# Patient Record
Sex: FEMALE | Race: WHITE | NOT HISPANIC OR LATINO | Employment: FULL TIME | URBAN - METROPOLITAN AREA
[De-identification: names, ages, dates, MRNs, and addresses within clinical notes are randomized per-mention and may not be internally consistent; named-entity substitution may affect disease eponyms.]

---
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
## 2023-06-24 ENCOUNTER — OFFICE VISIT (OUTPATIENT)
Dept: URGENT CARE | Facility: CLINIC | Age: 18
End: 2023-06-24
Payer: MEDICAID

## 2023-06-24 VITALS
RESPIRATION RATE: 18 BRPM | SYSTOLIC BLOOD PRESSURE: 108 MMHG | WEIGHT: 119.06 LBS | BODY MASS INDEX: 18.69 KG/M2 | OXYGEN SATURATION: 99 % | HEART RATE: 92 BPM | HEIGHT: 67 IN | DIASTOLIC BLOOD PRESSURE: 78 MMHG | TEMPERATURE: 98 F

## 2023-06-24 DIAGNOSIS — R51.9 UNILATERAL HEADACHE: Primary | ICD-10-CM

## 2023-06-24 PROCEDURE — 99213 PR OFFICE/OUTPT VISIT, EST, LEVL III, 20-29 MIN: ICD-10-PCS | Mod: S$GLB,,, | Performed by: NURSE PRACTITIONER

## 2023-06-24 PROCEDURE — 99213 OFFICE O/P EST LOW 20 MIN: CPT | Mod: S$GLB,,, | Performed by: NURSE PRACTITIONER

## 2023-06-24 RX ORDER — NAPROXEN 500 MG/1
500 TABLET ORAL 2 TIMES DAILY WITH MEALS
Qty: 14 TABLET | Refills: 0 | Status: SHIPPED | OUTPATIENT
Start: 2023-06-24 | End: 2023-07-01

## 2023-06-24 RX ORDER — PREDNISONE 20 MG/1
20 TABLET ORAL DAILY
Qty: 5 TABLET | Refills: 0 | Status: SHIPPED | OUTPATIENT
Start: 2023-06-24 | End: 2023-06-29

## 2023-06-24 NOTE — PATIENT INSTRUCTIONS
Please drink plenty of fluids.  Please get plenty of rest.  Please return here or go to the Emergency Department for any concerns or worsening of condition.    If you were prescribed a narcotic medication, do not drive or operate heavy equipment or machinery while taking these medications.    For patients who are not allergic to and are not on anticoagulants, you can alternate Tylenol every 4 hours and Motrin every 6 hours.  For patients who are allergic or intolerant to NSAIDS, have gastritis, gastric ulcers, or history of GI bleeds, are pregnant, or are on anticoagulant therapy, you can take Tylenol every 4 hours as needed for pain.    You should schedule a follow-up appointment with your Primary Care Provider for a recheck in 2-3 days or as directed at this visit.     If your condition fails to improve in a timely manner, you should receive another evaluation by your Primary Care Provider to discuss your concerns or return to urgent care for a recheck.      If your condition worsens at any time, you should report immediately to your nearest Emergency Department for further evaluation. **You must understand that you have received Urgent Care treatment only and that you may be released before all of your medical problems are known or treated. You, the patient, are responsible to arrange for follow-up care as instructed.        Please follow up with your primary care doctor or specialist as needed.        Tension Headache    A muscle tension headache is a very common cause of head pain. Its also called a stress headache. When some people are under stress, they tense the muscles of their shoulder, neck, and scalp without knowing it. If this tension lasts long enough, a headache can occur. A tension headache can be quite painful. It can last for hours or even days.  Home care  Follow these tips when caring for yourself at home:  Dont drive yourself home if you were given pain medicine for your headache. Instead,  have someone else drive you home. Try to sleep when you get home. You should feel much better when you wake up.  Put heat on the back of your neck to help ease neck spasm.  Drink only clear liquids or eat a light diet until your symptoms get better. This will help you avoid nausea or vomiting.  How to prevent headaches  Figure out what is causing stress in your life. Learn new ways to handle your stress. Ideas include regular exercise, biofeedback, self-hypnosis, yoga, and meditation. Talk with your healthcare provider to find out more information about managing stress. Many books and digital media are also available on this subject.  Take time out at the first sign of a tension headache, if possible. Take yourself out of the stressful situation. Find a quiet, comfortable place to sit or lie down and let yourself relax. Heat and deep massage of the tight areas in the neck and shoulders may help ease muscle spasm. You may also get relief from a medicine like ibuprofen or a prescribed muscle relaxant.  Follow-up care  Follow up with your healthcare provider, or as advised. Talk with your provider if you have frequent headaches. He or she can figure out a treatment plan. Ask if you can have medicine to take at home the next time you get a bad headache. This may keep you from having to visit the emergency department in the future. You may need to see a headache specialist (neurologist) if you continue to have headaches.  When to seek medical advice  Call your healthcare provider right away if any of these occur:  Your head pain gets worse during sexual intercourse or strenuous activity  Your head pain doesnt get better within 24 hours  You arent able to keep liquids down (repeated vomiting)  Fever of 100.4ºF (38ºC) or higher, or as directed by your healthcare provider  Stiff neck  Extreme drowsiness, confusion, or fainting  Dizziness or dizziness with spinning sensation (vertigo)  Weakness in an arm or leg or one side  "of your face  You have difficulty speaking  Your vision changes  Date Last Reviewed: 8/1/2016  © 6059-5558 [employer]. [address] [place] [address], [place] [address]. All rights reserved. This information is not intended as a substitute for professional medical care. Always follow your healthcare professional's instructions.      Headache, Unspecified    A number of things can cause headaches. The cause of your headache isnt clear. But it doesnt seem to be a sign of any serious illness.  You could have a tension headache or a migraine headache.  Stress can cause a tension headache. This can happen if you tense the muscles of your shoulders, neck, and scalp without knowing it. If this stress lasts long enough, you may develop a tension headache.  It is not clear why migraines occur, but certain things called" triggers" can raise the risk of having a migraine attack. Migraine triggers may include emotional stress or depression, or by hormone changes during the menstrual cycle. Other triggers include birth control pills and other medicines, alcohol or caffeine, foods with tyramine (such as aged cheese, wine), eyestrain, weather changes, missed meals, and lack of sleep or oversleeping.  Other causes of headache include:  Viral illness with high fever  Head injury with concussion  Sinus, ear, or throat infection  Dental pain and jaw joint (TMJ) pain  More serious but less common causes of headache include stroke, brain hemorrhage, brain tumor, meningitis, and encephalitis.  Home care  Follow these tips when taking care of yourself at home:  Dont drive yourself home if you were given pain medicine for your headache. Instead, have someone else drive you home. Try to sleep when you get home. You should feel much better when you wake up.  Apply heat to the back of your neck to ease a neck muscle spasm. Take care of a migraine headache by putting an ice pack on your forehead or at the base of your skull.  If you " have nausea or vomiting, eat a light diet until your headache eases.  If you have a migraine headache, use sunglasses when in the daylight or around bright indoor lighting until your symptoms get better. Bright glaring light can make this type of headache worse.  Follow-up care  Follow up with your healthcare provider, or as advised. Talk with your provider if you have frequent headaches. He or she can help figure out a treatment plan. By knowing the earliest signs of headache, and starting treatment right away, you may be able to stop the pain yourself.  When to seek medical advice  Call your healthcare provider right away if any of these occur:  Your head pain suddenly gets worse after sexual intercourse or strenuous activity  Your head pain doesnt get better within 24 hours  You arent able to keep liquids down (repeated vomiting)  Fever of 100.4ºF (38ºC) or higher, or as directed by your healthcare provider  Stiff neck  Extreme drowsiness, confusion, or fainting  Dizziness or dizziness with spinning sensation (vertigo)  Weakness in an arm or leg or one side of your face  You have trouble talking or seeing  Date Last Reviewed: 8/1/2016  © 6211-4412 [employer]. [address] [place] [address]. All rights reserved. This information is not intended as a substitute for professional medical care. Always follow your healthcare professional's instructions.            1.  Take all medications as directed. If you have been prescribed antibiotics, make sure to complete them.   2.  Rest and keep yourself/patient well hydrated. For adults, it is recommended to drink at least 8-10 glasses of water daily.   3. You should schedule a follow-up appointment with your Primary Care Provider/Pediatrician for recheck in 2-3 days or as directed at this visit.   4.  If your condition fails to improve in a timely manner, you should receive another evaluation by your Primary Care Provider/Pediatrician to discuss  your concerns or return to urgent care for a recheck.  If your condition worsens at any time, you should report immediately to your nearest Emergency Department for further evaluation. **You must understand that you have received Urgent Care treatment only and that you may be released before all of your medical problems are known or treated. You, the patient, are responsible to arrange for follow-up care as instructed.

## 2023-06-24 NOTE — LETTER
June 24, 2023      [facility]  [address], [address]  [place] [address]  Phone: [phone]  Fax: [phone]       Patient: Eboni Wyatt   YOB: 2005  Date of Visit: 06/24/2023    To Whom It May Concern:    Lj Wyatt  was at [facility] on 06/24/2023. The patient may return to work/school on 6/25/2023 with no restrictions if symptoms have resolved. If you have any questions or concerns, or if I can be of further assistance, please do not hesitate to contact me.    Sincerely,    Theresa Blair NP

## 2023-06-24 NOTE — PROGRESS NOTES
"Subjective:      Patient ID: Eboni Wyatt is a 18 y.o. female.    Vitals:  height is 5' 6.89" (1.699 m) and weight is 54 kg (119 lb 0.8 oz). Her oral temperature is 97.8 °F (36.6 °C). Her blood pressure is 108/78 and her pulse is 92. Her respiration is 18 and oxygen saturation is 99%.     Chief Complaint: Migraine    Patient is coming in for a migraine that's been going on for 3 days. Pain is on right side behind eye and radiates to temporal area and to neck. Pain described as sharp and throbbing. Reports associated intermittent blurry vision and intermittent nausea. Blurry vision is new onset and lasts a few seconds. Wears glasses but no new changes in prescription. No fever or chills. No dizziness or syncope. No recent illness. Pt states she has hx of migraines about 2 x month that has been going on since she was very young.  States this episode lasting longer than usual and no relief with OTC medication. Was on amitriptyline in the past but quit working and no longer taking.  No previous testing. She has never been evaluated by neurology.     Migraine   This is a new problem. The current episode started in the past 7 days. The problem occurs constantly. The problem has been unchanged. The pain is located in the Right unilateral region. Radiates to: behind her eyes to her tempal. The quality of the pain is described as sharp and throbbing. The pain is at a severity of 5/10. The pain is moderate. Associated symptoms include blurred vision, eye pain and nausea. Pertinent negatives include no abdominal pain, dizziness, eye redness, eye watering, fever, hearing loss, numbness, phonophobia, photophobia, scalp tenderness, sinus pressure, tingling, tinnitus or weakness. Nothing aggravates the symptoms. Treatments tried: OTC medication. The treatment provided no relief. Her past medical history is significant for migraine headaches.     Constitution: Negative for fever.   HENT:  Negative for tinnitus, hearing loss " and sinus pressure.    Eyes:  Positive for eye pain and blurred vision. Negative for eye redness and photophobia.   Gastrointestinal:  Positive for nausea. Negative for abdominal pain.   Neurological:  Positive for history of migraines. Negative for dizziness and numbness.    Objective:     Physical Exam   Constitutional: She is oriented to person, place, and time. She appears well-developed.  Non-toxic appearance. She does not appear ill. No distress.   HENT:   Head: Normocephalic and atraumatic.   Ears:   Right Ear: Tympanic membrane, external ear and ear canal normal.   Left Ear: Tympanic membrane, external ear and ear canal normal.   Nose: Nose normal. No mucosal edema, rhinorrhea or nasal deformity. No epistaxis. Right sinus exhibits no maxillary sinus tenderness and no frontal sinus tenderness. Left sinus exhibits no maxillary sinus tenderness and no frontal sinus tenderness.   Mouth/Throat: Uvula is midline, oropharynx is clear and moist and mucous membranes are normal. No trismus in the jaw. Normal dentition. No uvula swelling. No posterior oropharyngeal erythema.   Eyes: Conjunctivae, EOM and lids are normal. Pupils are equal, round, and reactive to light. No visual field deficit is present. Right eye exhibits no discharge. Left eye exhibits no discharge. No scleral icterus. Extraocular movement intact   Neck: Trachea normal and phonation normal. Neck supple. No neck rigidity present. No decreased range of motion present.   Cardiovascular: Normal rate, regular rhythm, normal heart sounds and normal pulses.   Pulmonary/Chest: Effort normal and breath sounds normal. No accessory muscle usage. No tachypnea. No respiratory distress.   Abdominal: Normal appearance and bowel sounds are normal. She exhibits no distension. Soft. There is no abdominal tenderness.   Musculoskeletal: Normal range of motion.         General: No deformity. Normal range of motion.   Neurological: She is alert and oriented to person,  place, and time. She has normal motor skills and normal sensation. She displays facial symmetry. No cranial nerve deficit. She exhibits normal muscle tone. Gait and coordination normal. Coordination normal. GCS eye subscore is 4. GCS verbal subscore is 5. GCS motor subscore is 6.   Skin: Skin is warm, dry, intact, not diaphoretic and not pale.   Psychiatric: She experiences Normal attention. Her speech is normal and behavior is normal. Judgment and thought content normal.   Nursing note and vitals reviewed.    Assessment:     1. Unilateral headache        Plan:     Vision Screening    Right eye Left eye Both eyes   Without correction      With correction 20/15 20/20 20/15        Unilateral headache  -     naproxen (NAPROSYN) 500 MG tablet; Take 1 tablet (500 mg total) by mouth 2 (two) times daily with meals. for 7 days  Dispense: 14 tablet; Refill: 0  -     predniSONE (DELTASONE) 20 MG tablet; Take 1 tablet (20 mg total) by mouth once daily. for 5 days  Dispense: 5 tablet; Refill: 0  -     Ambulatory referral/consult to Neurology

## 2023-06-28 ENCOUNTER — TELEPHONE (OUTPATIENT)
Dept: URGENT CARE | Facility: CLINIC | Age: 18
End: 2023-06-28
Payer: MEDICAID

## 2023-06-28 NOTE — TELEPHONE ENCOUNTER
Called pt for courtesy call back. Pt states she is feeling better but still having headaches and hs an apt with primary.

## 2023-09-30 ENCOUNTER — OFFICE VISIT (OUTPATIENT)
Dept: URGENT CARE | Facility: CLINIC | Age: 18
End: 2023-09-30
Payer: MEDICAID

## 2023-09-30 VITALS
SYSTOLIC BLOOD PRESSURE: 108 MMHG | BODY MASS INDEX: 23.41 KG/M2 | OXYGEN SATURATION: 98 % | HEIGHT: 61 IN | WEIGHT: 124 LBS | TEMPERATURE: 99 F | DIASTOLIC BLOOD PRESSURE: 77 MMHG | RESPIRATION RATE: 18 BRPM | HEART RATE: 112 BPM

## 2023-09-30 DIAGNOSIS — J02.0 STREP PHARYNGITIS: Primary | ICD-10-CM

## 2023-09-30 DIAGNOSIS — R00.0 TACHYCARDIA: ICD-10-CM

## 2023-09-30 DIAGNOSIS — J02.9 SORE THROAT: ICD-10-CM

## 2023-09-30 LAB
CTP QC/QA: YES
MOLECULAR STREP A: POSITIVE

## 2023-09-30 PROCEDURE — 87651 STREP A DNA AMP PROBE: CPT | Mod: QW,S$GLB,, | Performed by: PHYSICIAN ASSISTANT

## 2023-09-30 PROCEDURE — 87651 POCT STREP A MOLECULAR: ICD-10-PCS | Mod: QW,S$GLB,, | Performed by: PHYSICIAN ASSISTANT

## 2023-09-30 PROCEDURE — 99214 PR OFFICE/OUTPT VISIT, EST, LEVL IV, 30-39 MIN: ICD-10-PCS | Mod: S$GLB,,, | Performed by: PHYSICIAN ASSISTANT

## 2023-09-30 PROCEDURE — 99214 OFFICE O/P EST MOD 30 MIN: CPT | Mod: S$GLB,,, | Performed by: PHYSICIAN ASSISTANT

## 2023-09-30 RX ORDER — AZITHROMYCIN 500 MG/1
500 TABLET, FILM COATED ORAL DAILY
Qty: 5 TABLET | Refills: 0 | Status: SHIPPED | OUTPATIENT
Start: 2023-09-30 | End: 2023-10-05

## 2023-09-30 RX ORDER — ESCITALOPRAM OXALATE 10 MG/1
10 TABLET ORAL
COMMUNITY
Start: 2023-06-30

## 2023-10-05 ENCOUNTER — TELEPHONE (OUTPATIENT)
Dept: NEUROLOGY | Facility: CLINIC | Age: 18
End: 2023-10-05
Payer: MEDICAID

## 2023-10-05 NOTE — TELEPHONE ENCOUNTER
----- Message from Kaycee Harkins sent at 7/18/2023  3:16 PM CDT -----  Regarding: Appt Access  Hello, when attempting to schedule the following patient an appointment with the Neuro department, there were no available appointments due to the insurance. Ms. Wyatt has an Ambulatory referral/consult to Neurology referral that was placed by Theresa Blair NP on 6/24/23.    The patients diagnosis is Unilateral headache [R51.9].     I informed the patient that a member from the Neurology clinical staff will contact them to schedule. I have updated the patients contact information in Epic.       Thank you,     Kaycee Harkins  Access Navigator

## 2024-05-09 ENCOUNTER — HOSPITAL ENCOUNTER (INPATIENT)
Facility: HOSPITAL | Age: 19
LOS: 4 days | Discharge: HOME | DRG: 872 | End: 2024-05-13
Attending: FAMILY MEDICINE | Admitting: INTERNAL MEDICINE
Payer: MEDICAID

## 2024-05-09 DIAGNOSIS — I47.10 SVT (SUPRAVENTRICULAR TACHYCARDIA): ICD-10-CM

## 2024-05-09 DIAGNOSIS — N12 PYELONEPHRITIS: Primary | ICD-10-CM

## 2024-05-09 DIAGNOSIS — R07.9 CHEST PAIN: ICD-10-CM

## 2024-05-09 DIAGNOSIS — A41.9 SEPSIS, DUE TO UNSPECIFIED ORGANISM, UNSPECIFIED WHETHER ACUTE ORGAN DYSFUNCTION PRESENT: ICD-10-CM

## 2024-05-09 DIAGNOSIS — R00.0 TACHYCARDIA: ICD-10-CM

## 2024-05-09 LAB
ALBUMIN SERPL BCP-MCNC: 3.6 G/DL (ref 3.5–5.2)
ALP SERPL-CCNC: 102 U/L (ref 55–135)
ALT SERPL W/O P-5'-P-CCNC: 23 U/L (ref 10–44)
ANION GAP SERPL CALC-SCNC: 12 MMOL/L (ref 8–16)
AST SERPL-CCNC: 20 U/L (ref 10–40)
B-HCG UR QL: NEGATIVE
BACTERIA #/AREA URNS HPF: ABNORMAL /HPF
BASOPHILS # BLD AUTO: 0.02 K/UL (ref 0–0.2)
BASOPHILS NFR BLD: 0.1 % (ref 0–1.9)
BILIRUB SERPL-MCNC: 0.5 MG/DL (ref 0.1–1)
BILIRUB UR QL STRIP: NEGATIVE
BUN SERPL-MCNC: 7 MG/DL (ref 6–20)
CALCIUM SERPL-MCNC: 9.2 MG/DL (ref 8.7–10.5)
CHLORIDE SERPL-SCNC: 106 MMOL/L (ref 95–110)
CLARITY UR: ABNORMAL
CO2 SERPL-SCNC: 19 MMOL/L (ref 23–29)
COLOR UR: YELLOW
CREAT SERPL-MCNC: 0.8 MG/DL (ref 0.5–1.4)
DIFFERENTIAL METHOD BLD: ABNORMAL
EOSINOPHIL # BLD AUTO: 0 K/UL (ref 0–0.5)
EOSINOPHIL NFR BLD: 0 % (ref 0–8)
ERYTHROCYTE [DISTWIDTH] IN BLOOD BY AUTOMATED COUNT: 12.1 % (ref 11.5–14.5)
EST. GFR  (NO RACE VARIABLE): >60 ML/MIN/1.73 M^2
GLUCOSE SERPL-MCNC: 131 MG/DL (ref 70–110)
GLUCOSE UR QL STRIP: NEGATIVE
HCT VFR BLD AUTO: 39.8 % (ref 37–48.5)
HGB BLD-MCNC: 13.4 G/DL (ref 12–16)
HGB UR QL STRIP: ABNORMAL
HYALINE CASTS #/AREA URNS LPF: 0 /LPF
IMM GRANULOCYTES # BLD AUTO: 0.07 K/UL (ref 0–0.04)
IMM GRANULOCYTES NFR BLD AUTO: 0.5 % (ref 0–0.5)
KETONES UR QL STRIP: NEGATIVE
LACTATE SERPL-SCNC: 1.9 MMOL/L (ref 0.5–2.2)
LEUKOCYTE ESTERASE UR QL STRIP: ABNORMAL
LYMPHOCYTES # BLD AUTO: 1.2 K/UL (ref 1–4.8)
LYMPHOCYTES NFR BLD: 8.3 % (ref 18–48)
MCH RBC QN AUTO: 30.7 PG (ref 27–31)
MCHC RBC AUTO-ENTMCNC: 33.7 G/DL (ref 32–36)
MCV RBC AUTO: 91 FL (ref 82–98)
MICROSCOPIC COMMENT: ABNORMAL
MONOCYTES # BLD AUTO: 1.2 K/UL (ref 0.3–1)
MONOCYTES NFR BLD: 8.3 % (ref 4–15)
NEUTROPHILS # BLD AUTO: 11.7 K/UL (ref 1.8–7.7)
NEUTROPHILS NFR BLD: 82.8 % (ref 38–73)
NITRITE UR QL STRIP: NEGATIVE
NRBC BLD-RTO: 0 /100 WBC
PH UR STRIP: 6 [PH] (ref 5–8)
PLATELET # BLD AUTO: 181 K/UL (ref 150–450)
PMV BLD AUTO: 9.8 FL (ref 9.2–12.9)
POTASSIUM SERPL-SCNC: 3.9 MMOL/L (ref 3.5–5.1)
PROCALCITONIN SERPL IA-MCNC: 0.85 NG/ML
PROT SERPL-MCNC: 7 G/DL (ref 6–8.4)
PROT UR QL STRIP: ABNORMAL
RBC # BLD AUTO: 4.36 M/UL (ref 4–5.4)
RBC #/AREA URNS HPF: 16 /HPF (ref 0–4)
SODIUM SERPL-SCNC: 137 MMOL/L (ref 136–145)
SP GR UR STRIP: 1.01 (ref 1–1.03)
SQUAMOUS #/AREA URNS HPF: 2 /HPF
URN SPEC COLLECT METH UR: ABNORMAL
UROBILINOGEN UR STRIP-ACNC: NEGATIVE EU/DL
WBC # BLD AUTO: 14.15 K/UL (ref 3.9–12.7)
WBC #/AREA URNS HPF: >100 /HPF (ref 0–5)
WBC CLUMPS URNS QL MICRO: ABNORMAL
YEAST URNS QL MICRO: ABNORMAL

## 2024-05-09 PROCEDURE — 93010 ELECTROCARDIOGRAM REPORT: CPT | Mod: ,,, | Performed by: STUDENT IN AN ORGANIZED HEALTH CARE EDUCATION/TRAINING PROGRAM

## 2024-05-09 PROCEDURE — 87077 CULTURE AEROBIC IDENTIFY: CPT | Performed by: FAMILY MEDICINE

## 2024-05-09 PROCEDURE — 81025 URINE PREGNANCY TEST: CPT | Performed by: FAMILY MEDICINE

## 2024-05-09 PROCEDURE — 85025 COMPLETE CBC W/AUTO DIFF WBC: CPT | Performed by: FAMILY MEDICINE

## 2024-05-09 PROCEDURE — 63600175 PHARM REV CODE 636 W HCPCS: Performed by: FAMILY MEDICINE

## 2024-05-09 PROCEDURE — 96365 THER/PROPH/DIAG IV INF INIT: CPT

## 2024-05-09 PROCEDURE — 81000 URINALYSIS NONAUTO W/SCOPE: CPT | Performed by: FAMILY MEDICINE

## 2024-05-09 PROCEDURE — 11000001 HC ACUTE MED/SURG PRIVATE ROOM

## 2024-05-09 PROCEDURE — 25000003 PHARM REV CODE 250: Performed by: FAMILY MEDICINE

## 2024-05-09 PROCEDURE — 87088 URINE BACTERIA CULTURE: CPT | Performed by: FAMILY MEDICINE

## 2024-05-09 PROCEDURE — 83605 ASSAY OF LACTIC ACID: CPT | Performed by: FAMILY MEDICINE

## 2024-05-09 PROCEDURE — 87086 URINE CULTURE/COLONY COUNT: CPT | Performed by: FAMILY MEDICINE

## 2024-05-09 PROCEDURE — 93010 ELECTROCARDIOGRAM REPORT: CPT | Mod: 59,,, | Performed by: STUDENT IN AN ORGANIZED HEALTH CARE EDUCATION/TRAINING PROGRAM

## 2024-05-09 PROCEDURE — 93005 ELECTROCARDIOGRAM TRACING: CPT

## 2024-05-09 PROCEDURE — 87186 SC STD MICRODIL/AGAR DIL: CPT | Performed by: FAMILY MEDICINE

## 2024-05-09 PROCEDURE — 99285 EMERGENCY DEPT VISIT HI MDM: CPT | Mod: 25

## 2024-05-09 PROCEDURE — 96361 HYDRATE IV INFUSION ADD-ON: CPT

## 2024-05-09 PROCEDURE — 84145 PROCALCITONIN (PCT): CPT | Performed by: FAMILY MEDICINE

## 2024-05-09 PROCEDURE — 96375 TX/PRO/DX INJ NEW DRUG ADDON: CPT

## 2024-05-09 PROCEDURE — 80053 COMPREHEN METABOLIC PANEL: CPT | Performed by: FAMILY MEDICINE

## 2024-05-09 PROCEDURE — 87040 BLOOD CULTURE FOR BACTERIA: CPT | Performed by: FAMILY MEDICINE

## 2024-05-09 RX ORDER — ADENOSINE 3 MG/ML
6 INJECTION INTRAVENOUS
Status: DISCONTINUED | OUTPATIENT
Start: 2024-05-09 | End: 2024-05-09

## 2024-05-09 RX ORDER — CYCLOBENZAPRINE HCL 10 MG
10 TABLET ORAL
COMMUNITY
Start: 2024-05-08

## 2024-05-09 RX ORDER — METOPROLOL TARTRATE 1 MG/ML
5 INJECTION, SOLUTION INTRAVENOUS
Status: COMPLETED | OUTPATIENT
Start: 2024-05-09 | End: 2024-05-09

## 2024-05-09 RX ORDER — ADENOSINE 3 MG/ML
INJECTION INTRAVENOUS
Status: COMPLETED
Start: 2024-05-09 | End: 2024-05-09

## 2024-05-09 RX ORDER — NAPROXEN 500 MG/1
500 TABLET ORAL 2 TIMES DAILY PRN
Status: ON HOLD | COMMUNITY
Start: 2024-05-08 | End: 2024-05-13 | Stop reason: HOSPADM

## 2024-05-09 RX ADMIN — SODIUM CHLORIDE 1000 ML: 9 INJECTION, SOLUTION INTRAVENOUS at 09:05

## 2024-05-09 RX ADMIN — SODIUM CHLORIDE 1000 ML: 9 INJECTION, SOLUTION INTRAVENOUS at 10:05

## 2024-05-09 RX ADMIN — CEFTRIAXONE 1 G: 1 INJECTION, POWDER, FOR SOLUTION INTRAMUSCULAR; INTRAVENOUS at 11:05

## 2024-05-09 RX ADMIN — METOROPROLOL TARTRATE 5 MG: 5 INJECTION, SOLUTION INTRAVENOUS at 09:05

## 2024-05-10 PROBLEM — F41.8 DEPRESSION WITH ANXIETY: Status: ACTIVE | Noted: 2024-05-10

## 2024-05-10 PROBLEM — R73.9 HYPERGLYCEMIA: Status: ACTIVE | Noted: 2024-05-10

## 2024-05-10 PROBLEM — E87.20 ACIDOSIS: Status: ACTIVE | Noted: 2024-05-10

## 2024-05-10 PROBLEM — R00.0 SINUS TACHYCARDIA: Status: ACTIVE | Noted: 2024-05-10

## 2024-05-10 PROBLEM — N12 PYELONEPHRITIS: Status: ACTIVE | Noted: 2024-05-10

## 2024-05-10 PROBLEM — A41.9 SEPSIS: Status: ACTIVE | Noted: 2024-05-10

## 2024-05-10 LAB
ALBUMIN SERPL BCP-MCNC: 2.5 G/DL (ref 3.5–5.2)
ALP SERPL-CCNC: 80 U/L (ref 55–135)
ALT SERPL W/O P-5'-P-CCNC: 17 U/L (ref 10–44)
ANION GAP SERPL CALC-SCNC: 8 MMOL/L (ref 8–16)
AST SERPL-CCNC: 15 U/L (ref 10–40)
BASOPHILS # BLD AUTO: 0.01 K/UL (ref 0–0.2)
BASOPHILS NFR BLD: 0.1 % (ref 0–1.9)
BILIRUB SERPL-MCNC: 0.6 MG/DL (ref 0.1–1)
BUN SERPL-MCNC: 5 MG/DL (ref 6–20)
CALCIUM SERPL-MCNC: 7.7 MG/DL (ref 8.7–10.5)
CHLORIDE SERPL-SCNC: 110 MMOL/L (ref 95–110)
CO2 SERPL-SCNC: 17 MMOL/L (ref 23–29)
CREAT SERPL-MCNC: 0.7 MG/DL (ref 0.5–1.4)
DIFFERENTIAL METHOD BLD: ABNORMAL
EOSINOPHIL # BLD AUTO: 0 K/UL (ref 0–0.5)
EOSINOPHIL NFR BLD: 0 % (ref 0–8)
ERYTHROCYTE [DISTWIDTH] IN BLOOD BY AUTOMATED COUNT: 12.3 % (ref 11.5–14.5)
EST. GFR  (NO RACE VARIABLE): >60 ML/MIN/1.73 M^2
ESTIMATED AVG GLUCOSE: 91 MG/DL (ref 68–131)
GLUCOSE SERPL-MCNC: 108 MG/DL (ref 70–110)
HBA1C MFR BLD: 4.8 % (ref 4–5.6)
HCT VFR BLD AUTO: 31.7 % (ref 37–48.5)
HGB BLD-MCNC: 10.9 G/DL (ref 12–16)
IMM GRANULOCYTES # BLD AUTO: 0.15 K/UL (ref 0–0.04)
IMM GRANULOCYTES NFR BLD AUTO: 1.4 % (ref 0–0.5)
LACTATE SERPL-SCNC: 1.3 MMOL/L (ref 0.5–2.2)
LACTATE SERPL-SCNC: 1.4 MMOL/L (ref 0.5–2.2)
LYMPHOCYTES # BLD AUTO: 0.5 K/UL (ref 1–4.8)
LYMPHOCYTES NFR BLD: 4.7 % (ref 18–48)
MCH RBC QN AUTO: 31 PG (ref 27–31)
MCHC RBC AUTO-ENTMCNC: 34.4 G/DL (ref 32–36)
MCV RBC AUTO: 90 FL (ref 82–98)
MONOCYTES # BLD AUTO: 1 K/UL (ref 0.3–1)
MONOCYTES NFR BLD: 9.2 % (ref 4–15)
NEUTROPHILS # BLD AUTO: 9.3 K/UL (ref 1.8–7.7)
NEUTROPHILS NFR BLD: 84.6 % (ref 38–73)
NRBC BLD-RTO: 0 /100 WBC
OHS QRS DURATION: 70 MS
OHS QRS DURATION: 72 MS
OHS QTC CALCULATION: 382 MS
OHS QTC CALCULATION: 418 MS
PLATELET # BLD AUTO: 144 K/UL (ref 150–450)
PMV BLD AUTO: 9.8 FL (ref 9.2–12.9)
POTASSIUM SERPL-SCNC: 3.3 MMOL/L (ref 3.5–5.1)
PROT SERPL-MCNC: 5.2 G/DL (ref 6–8.4)
RBC # BLD AUTO: 3.52 M/UL (ref 4–5.4)
SODIUM SERPL-SCNC: 135 MMOL/L (ref 136–145)
T4 FREE SERPL-MCNC: 0.87 NG/DL (ref 0.71–1.51)
TSH SERPL DL<=0.005 MIU/L-ACNC: 1.18 UIU/ML (ref 0.4–4)
WBC # BLD AUTO: 11.01 K/UL (ref 3.9–12.7)

## 2024-05-10 PROCEDURE — 25000003 PHARM REV CODE 250: Performed by: INTERNAL MEDICINE

## 2024-05-10 PROCEDURE — 63600175 PHARM REV CODE 636 W HCPCS: Performed by: INTERNAL MEDICINE

## 2024-05-10 PROCEDURE — 36415 COLL VENOUS BLD VENIPUNCTURE: CPT | Performed by: INTERNAL MEDICINE

## 2024-05-10 PROCEDURE — 25000003 PHARM REV CODE 250: Performed by: FAMILY MEDICINE

## 2024-05-10 PROCEDURE — 21400001 HC TELEMETRY ROOM

## 2024-05-10 PROCEDURE — 84439 ASSAY OF FREE THYROXINE: CPT | Performed by: INTERNAL MEDICINE

## 2024-05-10 PROCEDURE — 63600175 PHARM REV CODE 636 W HCPCS: Performed by: FAMILY MEDICINE

## 2024-05-10 PROCEDURE — 96361 HYDRATE IV INFUSION ADD-ON: CPT

## 2024-05-10 PROCEDURE — 83036 HEMOGLOBIN GLYCOSYLATED A1C: CPT | Performed by: INTERNAL MEDICINE

## 2024-05-10 PROCEDURE — 80053 COMPREHEN METABOLIC PANEL: CPT | Performed by: INTERNAL MEDICINE

## 2024-05-10 PROCEDURE — 84443 ASSAY THYROID STIM HORMONE: CPT | Performed by: INTERNAL MEDICINE

## 2024-05-10 PROCEDURE — 85025 COMPLETE CBC W/AUTO DIFF WBC: CPT | Performed by: INTERNAL MEDICINE

## 2024-05-10 PROCEDURE — 83605 ASSAY OF LACTIC ACID: CPT | Performed by: INTERNAL MEDICINE

## 2024-05-10 RX ORDER — OXYCODONE HYDROCHLORIDE 5 MG/1
10 TABLET ORAL EVERY 4 HOURS PRN
Status: DISCONTINUED | OUTPATIENT
Start: 2024-05-10 | End: 2024-05-13 | Stop reason: HOSPADM

## 2024-05-10 RX ORDER — SODIUM CHLORIDE 0.9 % (FLUSH) 0.9 %
10 SYRINGE (ML) INJECTION EVERY 12 HOURS PRN
Status: DISCONTINUED | OUTPATIENT
Start: 2024-05-10 | End: 2024-05-13 | Stop reason: HOSPADM

## 2024-05-10 RX ORDER — SODIUM CHLORIDE 9 MG/ML
INJECTION, SOLUTION INTRAVENOUS CONTINUOUS
Status: DISCONTINUED | OUTPATIENT
Start: 2024-05-10 | End: 2024-05-11

## 2024-05-10 RX ORDER — IBUPROFEN 200 MG
16 TABLET ORAL
Status: DISCONTINUED | OUTPATIENT
Start: 2024-05-10 | End: 2024-05-13 | Stop reason: HOSPADM

## 2024-05-10 RX ORDER — GLUCAGON 1 MG
1 KIT INJECTION
Status: DISCONTINUED | OUTPATIENT
Start: 2024-05-10 | End: 2024-05-13 | Stop reason: HOSPADM

## 2024-05-10 RX ORDER — MORPHINE SULFATE 4 MG/ML
2 INJECTION, SOLUTION INTRAMUSCULAR; INTRAVENOUS EVERY 6 HOURS PRN
Status: DISCONTINUED | OUTPATIENT
Start: 2024-05-10 | End: 2024-05-10

## 2024-05-10 RX ORDER — ONDANSETRON HYDROCHLORIDE 2 MG/ML
4 INJECTION, SOLUTION INTRAVENOUS EVERY 6 HOURS PRN
Status: DISCONTINUED | OUTPATIENT
Start: 2024-05-10 | End: 2024-05-13 | Stop reason: HOSPADM

## 2024-05-10 RX ORDER — IBUPROFEN 200 MG
24 TABLET ORAL
Status: DISCONTINUED | OUTPATIENT
Start: 2024-05-10 | End: 2024-05-13 | Stop reason: HOSPADM

## 2024-05-10 RX ORDER — IBUPROFEN 400 MG/1
800 TABLET ORAL EVERY 8 HOURS PRN
Status: DISCONTINUED | OUTPATIENT
Start: 2024-05-10 | End: 2024-05-13 | Stop reason: HOSPADM

## 2024-05-10 RX ORDER — KETOROLAC TROMETHAMINE 30 MG/ML
30 INJECTION, SOLUTION INTRAMUSCULAR; INTRAVENOUS
Status: COMPLETED | OUTPATIENT
Start: 2024-05-10 | End: 2024-05-10

## 2024-05-10 RX ORDER — ACETAMINOPHEN 500 MG
1000 TABLET ORAL EVERY 8 HOURS PRN
Status: DISCONTINUED | OUTPATIENT
Start: 2024-05-10 | End: 2024-05-13 | Stop reason: HOSPADM

## 2024-05-10 RX ORDER — MORPHINE SULFATE 2 MG/ML
2 INJECTION, SOLUTION INTRAMUSCULAR; INTRAVENOUS EVERY 4 HOURS PRN
Status: DISCONTINUED | OUTPATIENT
Start: 2024-05-10 | End: 2024-05-13 | Stop reason: HOSPADM

## 2024-05-10 RX ORDER — POTASSIUM CHLORIDE 20 MEQ/1
40 TABLET, EXTENDED RELEASE ORAL DAILY
Status: COMPLETED | OUTPATIENT
Start: 2024-05-10 | End: 2024-05-12

## 2024-05-10 RX ORDER — NALOXONE HCL 0.4 MG/ML
0.02 VIAL (ML) INJECTION
Status: DISCONTINUED | OUTPATIENT
Start: 2024-05-10 | End: 2024-05-13 | Stop reason: HOSPADM

## 2024-05-10 RX ORDER — ACETAMINOPHEN 325 MG/1
650 TABLET ORAL EVERY 6 HOURS PRN
Status: DISCONTINUED | OUTPATIENT
Start: 2024-05-10 | End: 2024-05-10

## 2024-05-10 RX ORDER — KETOROLAC TROMETHAMINE 30 MG/ML
30 INJECTION, SOLUTION INTRAMUSCULAR; INTRAVENOUS EVERY 6 HOURS PRN
Status: DISCONTINUED | OUTPATIENT
Start: 2024-05-10 | End: 2024-05-10

## 2024-05-10 RX ADMIN — SODIUM CHLORIDE: 9 INJECTION, SOLUTION INTRAVENOUS at 02:05

## 2024-05-10 RX ADMIN — ONDANSETRON 4 MG: 2 INJECTION INTRAMUSCULAR; INTRAVENOUS at 10:05

## 2024-05-10 RX ADMIN — MORPHINE SULFATE 2 MG: 4 INJECTION INTRAVENOUS at 01:05

## 2024-05-10 RX ADMIN — AMPICILLIN AND SULBACTAM 3 G: 2; 1 INJECTION, POWDER, FOR SOLUTION INTRAVENOUS at 07:05

## 2024-05-10 RX ADMIN — AMPICILLIN AND SULBACTAM 3 G: 2; 1 INJECTION, POWDER, FOR SOLUTION INTRAVENOUS at 02:05

## 2024-05-10 RX ADMIN — KETOROLAC TROMETHAMINE 30 MG: 30 INJECTION, SOLUTION INTRAMUSCULAR at 10:05

## 2024-05-10 RX ADMIN — IBUPROFEN 800 MG: 400 TABLET, FILM COATED ORAL at 11:05

## 2024-05-10 RX ADMIN — OXYCODONE HYDROCHLORIDE 10 MG: 5 TABLET ORAL at 05:05

## 2024-05-10 RX ADMIN — OXYCODONE HYDROCHLORIDE 10 MG: 5 TABLET ORAL at 11:05

## 2024-05-10 RX ADMIN — SODIUM CHLORIDE: 9 INJECTION, SOLUTION INTRAVENOUS at 07:05

## 2024-05-10 RX ADMIN — POTASSIUM CHLORIDE 40 MEQ: 1500 TABLET, EXTENDED RELEASE ORAL at 08:05

## 2024-05-10 RX ADMIN — KETOROLAC TROMETHAMINE 30 MG: 30 INJECTION, SOLUTION INTRAMUSCULAR at 12:05

## 2024-05-10 RX ADMIN — SODIUM CHLORIDE 1000 ML: 9 INJECTION, SOLUTION INTRAVENOUS at 12:05

## 2024-05-10 RX ADMIN — ACETAMINOPHEN 1000 MG: 500 TABLET ORAL at 07:05

## 2024-05-10 RX ADMIN — AMPICILLIN AND SULBACTAM 3 G: 2; 1 INJECTION, POWDER, FOR SOLUTION INTRAVENOUS at 08:05

## 2024-05-10 RX ADMIN — SODIUM CHLORIDE: 9 INJECTION, SOLUTION INTRAVENOUS at 05:05

## 2024-05-10 RX ADMIN — AMPICILLIN AND SULBACTAM 3 G: 2; 1 INJECTION, POWDER, FOR SOLUTION INTRAVENOUS at 01:05

## 2024-05-10 RX ADMIN — MORPHINE SULFATE 2 MG: 4 INJECTION INTRAVENOUS at 07:05

## 2024-05-10 NOTE — SUBJECTIVE & OBJECTIVE
Past Medical History:   Diagnosis Date    Anxiety     Depression        No past surgical history on file.    Review of patient's allergies indicates:   Allergen Reactions    Omnicef [cefdinir] Other (See Comments)     Unknown        No current facility-administered medications on file prior to encounter.     Current Outpatient Medications on File Prior to Encounter   Medication Sig    cyclobenzaprine (FLEXERIL) 10 MG tablet Take 10 mg by mouth.    medroxyprogesterone (DEPO-SUBQ PROVERA) 104 mg/0.65 mL injection Inject 104 mg into the skin every 3 (three) months.    naproxen (NAPROSYN) 500 MG tablet Take 500 mg by mouth 2 (two) times daily as needed.    EScitalopram oxalate (LEXAPRO) 10 MG tablet Take 10 mg by mouth.     Family History       Problem Relation (Age of Onset)    No Known Problems Mother, Father, Sister, Brother          Tobacco Use    Smoking status: Never     Passive exposure: Never    Smokeless tobacco: Never   Substance and Sexual Activity    Alcohol use: Never    Drug use: Never    Sexual activity: Yes     Partners: Male     Birth control/protection: Injection     Review of Systems   Constitutional:  Positive for chills and fever.   Cardiovascular:  Positive for palpitations.   Gastrointestinal:  Positive for nausea. Negative for vomiting.   Genitourinary:  Positive for dysuria, flank pain and urgency.   Musculoskeletal:  Positive for back pain.   Neurological:  Positive for headaches.   All other systems reviewed and are negative.    Objective:     Vital Signs (Most Recent):  Temp: 99 °F (37.2 °C) (05/09/24 2231)  Pulse: (!) 147 (05/10/24 0058)  Resp: (!) 24 (05/10/24 0058)  BP: 119/84 (05/10/24 0059)  SpO2: 99 % (05/10/24 0058) Vital Signs (24h Range):  Temp:  [99 °F (37.2 °C)-99.2 °F (37.3 °C)] 99 °F (37.2 °C)  Pulse:  [126-165] 147  Resp:  [17-24] 24  SpO2:  [99 %-100 %] 99 %  BP: (108-125)/(62-84) 119/84     Weight: 58.7 kg (129 lb 6.6 oz)  Body mass index is 24.45 kg/m².     Physical  Exam  Vitals reviewed.   Constitutional:       General: She is not in acute distress.     Appearance: She is ill-appearing. She is not diaphoretic.   HENT:      Nose: Nose normal.   Eyes:      Conjunctiva/sclera: Conjunctivae normal.   Cardiovascular:      Rate and Rhythm: Tachycardia present.   Pulmonary:      Effort: Pulmonary effort is normal. No respiratory distress.   Abdominal:      General: There is no distension.      Tenderness: There is abdominal tenderness. There is no rebound.   Musculoskeletal:         General: No swelling.   Skin:     General: Skin is warm and dry.   Neurological:      Mental Status: She is alert and oriented to person, place, and time.   Psychiatric:         Mood and Affect: Mood normal.         Behavior: Behavior normal.                Significant Labs: All pertinent labs within the past 24 hours have been reviewed.  Recent Lab Results         05/09/24 2227 05/09/24 2128 05/09/24 2121        Procalcitonin     0.85  Comment: A concentration < 0.25 ng/mL represents a low risk of bacterial   infection.  Procalcitonin may not be accurate among patients with localized   infection, recent trauma or major surgery, immunosuppressed state,   invasive fungal infection, renal dysfunction. Decisions regarding   initiation or continuation of antibiotic therapy should not be based   solely on procalcitonin levels.         Albumin     3.6       ALP     102       ALT     23       Anion Gap     12       Appearance, UA Hazy           AST     20       Bacteria, UA Occasional           Baso #     0.02       Basophil %     0.1       Bilirubin (UA) Negative           BILIRUBIN TOTAL     0.5  Comment: For infants and newborns, interpretation of results should be based  on gestational age, weight and in agreement with clinical  observations.    Premature Infant recommended reference ranges:  Up to 24 hours.............<8.0 mg/dL  Up to 48 hours............<12.0 mg/dL  3-5 days..................<15.0  mg/dL  6-29 days.................<15.0 mg/dL         BUN     7       Calcium     9.2       Chloride     106       CO2     19       Color, UA Yellow           Creatinine     0.8       Differential Method     Automated       eGFR     >60       Eos #     0.0       Eos %     0.0       Glucose     131       Glucose, UA Negative           Gran # (ANC)     11.7       Gran %     82.8       Hematocrit     39.8       Hemoglobin     13.4       Hyaline Casts, UA 0           Immature Grans (Abs)     0.07  Comment: Mild elevation in immature granulocytes is non specific and   can be seen in a variety of conditions including stress response,   acute inflammation, trauma and pregnancy. Correlation with other   laboratory and clinical findings is essential.         Immature Granulocytes     0.5       Ketones, UA Negative           Lactic Acid Level   1.9  Comment: Falsely low lactic acid results can be found in samples   containing >=13.0 mg/dL total bilirubin and/or >=3.5 mg/dL   direct bilirubin.           Leukocyte Esterase, UA 3+           Lymph #     1.2       Lymph %     8.3       MCH     30.7       MCHC     33.7       MCV     91       Microscopic Comment SEE COMMENT  Comment: Other formed elements not mentioned in the report are not   present in the microscopic examination.              Mono #     1.2       Mono %     8.3       MPV     9.8       NITRITE UA Negative           nRBC     0       Blood, UA 2+           pH, UA 6.0           Platelet Count     181       Potassium     3.9       hCG Qualitative, Urine Negative           PROTEIN TOTAL     7.0       Protein, UA 1+  Comment: Recommend a 24 hour urine protein or a urine   protein/creatinine ratio if globulin induced proteinuria is  clinically suspected.             RBC     4.36       RBC, UA 16           RDW     12.1       Sodium     137       Specific [place], UA 1.010           Specimen UA Urine, Clean Catch           Squam Epithel, UA 2           UROBILINOGEN UA  Negative           WBC Clumps, UA Many           WBC, UA >100           WBC     14.15       Yeast, UA Few                   Significant Imaging: I have reviewed all pertinent imaging results/findings within the past 24 hours.  CT Renal Stone Study ABD Pelvis WO   Final Result      Right pyelonephritis.      Cystitis.         Electronically signed by: Neno Lawrence   Date:    05/10/2024   Time:    00:14      X-Ray Chest AP Portable   Final Result      No acute abnormality.         Electronically signed by: Tho Aguilar   Date:    05/09/2024   Time:    21:42

## 2024-05-10 NOTE — ASSESSMENT & PLAN NOTE
Sinus tachycardia most likely related to underlying sepsis with pyelonephritis, and severe pain  -status post 3 L normal saline IV fluid bolus  -continue IV fluids with normal saline at 150 mL/hr  -p.r.n. pain control  -cardiac monitoring  -check TSH and free T4

## 2024-05-10 NOTE — ASSESSMENT & PLAN NOTE
Sepsis with right pyelonephritis  -CT scan of abdomen and pelvis consistent with right-sided pyelonephritis and cystitis  -white blood cell count 14.15, lactic acid level 1.9, hCG negative, procalcitonin level 0.85, urinalysis was consistent with UTI, urine culture pending, and blood cultures pending.  T-max 99.2.  -patient received Rocephin 1 g IV x1 dose in the emergency department; patient with reported history of Omnicef allergy  -place patient on IV Unasyn  -status post 3 L normal saline IV fluid bolus and we will continue IV fluids with normal saline at 150 mL/hr  -check lactic acid level q.4 hours x2   -check a.m. labs  -p.r.n. pain/nausea control

## 2024-05-10 NOTE — ED PROVIDER NOTES
SCRIBE #1 NOTE: I, Julio César Soria, am scribing for, and in the presence of, Lizbet Mckinney MD. I have scribed the entire note.       History     Chief Complaint   Patient presents with    Flank Pain     Right sided flank pain since Tuesday. Seen at  Wednesday, dx with pulled muscle in back. No relief with naproxen & flexeril. C/o fever, HA, nausea, heart palpitations, and urinary urgency. 's in triage     Review of patient's allergies indicates:   Allergen Reactions    Omnicef [cefdinir] Other (See Comments)     Unknown          History of Present Illness     HPI    5/9/2024, 9:22 PM  History obtained from the patient      History of Present Illness: Eboni Wyatt is a 19 y.o. female patient with a PMHx of anxiety and depression  who presents to the Emergency Department for evaluation of right sided flank pain which onset 2 days ago. Pt reports that she was seen at urgent care yesterday and was diagnosed with a pulled muscle located in her back. Symptoms are constant and moderate in severity. No mitigating or exacerbating factors reported. Associated sxs include lower back pain, fever, nausea, headache, heart palpitations, urinary urgency. Patient denies any CP, chest tightness, v/d, and all other sxs at this time. Prior Tx includes naproxen and flexeril with no improvement. No further complaints or concerns at this time.       Arrival mode: Personal vehicle     PCP: [facility]        Past Medical History:  Past Medical History:   Diagnosis Date    Anxiety     Depression        Past Surgical History:  No past surgical history on file.      Family History:  Family History   Problem Relation Name Age of Onset    No Known Problems Mother      No Known Problems Father      No Known Problems Sister      No Known Problems Brother         Social History:  Social History     Tobacco Use    Smoking status: Never     Passive exposure: Never    Smokeless tobacco: Never   Substance and Sexual Activity     Alcohol use: Never    Drug use: Never    Sexual activity: Yes     Partners: Male     Birth control/protection: Injection        Review of Systems     Review of Systems   Constitutional:  Positive for chills and fever.   HENT:  Negative for sore throat.    Respiratory:  Negative for chest tightness.    Cardiovascular:  Positive for palpitations. Negative for chest pain.   Gastrointestinal:  Positive for nausea. Negative for diarrhea and vomiting.   Genitourinary:  Positive for flank pain and urgency. Negative for dysuria.   Musculoskeletal:  Positive for back pain.   Skin:  Negative for rash.   Neurological:  Positive for headaches. Negative for weakness.   Hematological:  Does not bruise/bleed easily.      Physical Exam     Initial Vitals [05/09/24 2107]   BP Pulse Resp Temp SpO2   113/70 (!) 160 18 99.2 °F (37.3 °C) 100 %      MAP       --          Physical Exam  Nursing Notes and Vital Signs Reviewed.  Constitutional: Patient is in no acute distress. Well-developed and well-nourished.  Head: Atraumatic. Normocephalic.  Eyes: PERRL. EOM intact. Conjunctivae are not pale. No scleral icterus.  ENT: Mucous membranes are moist. Oropharynx is clear and symmetric.    Neck: Supple. Full ROM. No lymphadenopathy.  Cardiovascular: Tachycardic. Regular rhythm. No murmurs, rubs, or gallops. Distal pulses are 2+ and symmetric.  Pulmonary/Chest: No respiratory distress. Clear to auscultation bilaterally. No wheezing or rales.  Abdominal: Soft and non-distended.  There is no tenderness.  No rebound, guarding, or rigidity. Good bowel sounds.  Genitourinary: Right flank pain tenderness.   Musculoskeletal: Moves all extremities. No obvious deformities. No edema. No calf tenderness.  Skin: Warm and dry.  Neurological:  Alert, awake, and appropriate.  Normal speech.  No acute focal neurological deficits are appreciated.  Psychiatric: Normal affect. Good eye contact. Appropriate in content.     ED Course   Procedures  ED Vital  Signs:  Vitals:    05/09/24 2107 05/09/24 2114 05/09/24 2115 05/09/24 2118   BP: 113/70 125/66  125/66   Pulse: (!) 160 (!) 165 (!) 158    Resp: 18 (!) 22     Temp: 99.2 °F (37.3 °C) 99.2 °F (37.3 °C)     TempSrc: Oral Oral     SpO2: 100% 100%     Weight: 58.7 kg (129 lb 6.6 oz)       05/09/24 2129 05/09/24 2229 05/09/24 2231 05/09/24 2302   BP: 112/63 114/64 114/64 108/62   Pulse: (!) 126 (!) 136 (!) 127 (!) 129   Resp: (!) 21 17 19 (!) 23   Temp:   99 °F (37.2 °C)    TempSrc:       SpO2: 100% 100% 100% 100%   Weight:        05/10/24 0010 05/10/24 0058 05/10/24 0059   BP:   119/84   Pulse: (!) 132 (!) 147    Resp: (!) 23 (!) 24    Temp:      TempSrc:      SpO2: 100% 99%    Weight:          Abnormal Lab Results:  Labs Reviewed   CBC W/ AUTO DIFFERENTIAL - Abnormal; Notable for the following components:       Result Value    WBC 14.15 (*)     Gran # (ANC) 11.7 (*)     Immature Grans (Abs) 0.07 (*)     Mono # 1.2 (*)     Gran % 82.8 (*)     Lymph % 8.3 (*)     All other components within normal limits   COMPREHENSIVE METABOLIC PANEL - Abnormal; Notable for the following components:    CO2 19 (*)     Glucose 131 (*)     All other components within normal limits   URINALYSIS, REFLEX TO URINE CULTURE - Abnormal; Notable for the following components:    Appearance, UA Hazy (*)     Protein, UA 1+ (*)     Occult Blood UA 2+ (*)     Leukocytes, UA 3+ (*)     All other components within normal limits    Narrative:     Specimen Source->Urine   PROCALCITONIN - Abnormal; Notable for the following components:    Procalcitonin 0.85 (*)     All other components within normal limits   URINALYSIS MICROSCOPIC - Abnormal; Notable for the following components:    RBC, UA 16 (*)     WBC, UA >100 (*)     WBC Clumps, UA Many (*)     Yeast, UA Few (*)     All other components within normal limits    Narrative:     Specimen Source->Urine   CULTURE, BLOOD   CULTURE, BLOOD   CULTURE, URINE   LACTIC ACID, PLASMA   PREGNANCY TEST, URINE RAPID     Narrative:     Specimen Source->Urine   LACTIC ACID, PLASMA   TSH   T4, FREE   LACTIC ACID, PLASMA   LACTIC ACID, PLASMA   CBC W/ AUTO DIFFERENTIAL   COMPREHENSIVE METABOLIC PANEL   HEMOGLOBIN A1C        All Lab Results:      Imaging Results:  Imaging Results              CT Renal Stone Study ABD Pelvis WO (Final result)  Result time 05/10/24 00:14:41      Final result by Neno Larwence MD (05/10/24 00:14:41)                   Impression:      Right pyelonephritis.    Cystitis.      Electronically signed by: Neno Lawrence  Date:    05/10/2024  Time:    00:14               Narrative:    EXAMINATION:  CT RENAL STONE STUDY ABD PELVIS WO    CLINICAL HISTORY:  Flank pain, kidney stone suspected;    TECHNIQUE:  Low dose axial images, sagittal and coronal reformations were obtained from the lung bases to the pubic symphysis, Oral contrast was not administered.    COMPARISON:  None    FINDINGS:  Heart: Normal in size. No pericardial effusion.    Lung Bases: Well aerated, without consolidation or pleural fluid.    Liver: Normal in size and attenuation, with no focal hepatic lesions.    Gallbladder: No calcified gallstones.    Bile Ducts: No evidence of dilated ducts.    Pancreas: No mass or peripancreatic fat stranding.    Spleen: Unremarkable.    Adrenals: Unremarkable.    Kidneys/ Ureters: Mild right hydroureteronephrosis with perinephric and periureteral infectious/inflammatory edema.  No obstructive renal stone.    Bladder: Bladder wall thickening.    Reproductive organs: Unremarkable.    GI Tract/Mesentery: No evidence of bowel obstruction or inflammation.    Peritoneal Space: No ascites. No free air.    Retroperitoneum: No significant adenopathy.    Abdominal wall: Unremarkable.    Vasculature: No significant atherosclerosis or aneurysm.    Bones: No acute fracture.                                       X-Ray Chest AP Portable (Final result)  Result time 05/09/24 21:42:13      Final result by Lauren  MD Tho (05/09/24 21:42:13)                   Impression:      No acute abnormality.      Electronically signed by: Tho Aguilar  Date:    05/09/2024  Time:    21:42               Narrative:    EXAMINATION:  XR CHEST AP PORTABLE    CLINICAL HISTORY:  Sepsis;    TECHNIQUE:  Single frontal view of the chest was performed.    COMPARISON:  None    FINDINGS:  Overlying wires and defibrillator pads noted.The lungs are clear, with normal appearance of pulmonary vasculature and no pleural effusion or pneumothorax.    The cardiac silhouette is normal in size. The hilar and mediastinal contours are unremarkable.    Bones are intact.                                       The EKG was ordered, reviewed, and independently interpreted by the ED provider.  Interpretation time: 21:19  Rate: 128 BPM  Rhythm: sinus tachycardia  Interpretation: ST & T wave abnormality, consider ischemia. No STEMI.  When compared to EKG performed, there are no significant changes.           The Emergency Provider reviewed the vital signs and test results, which are outlined above.     ED Discussion       1:16 AM: Discussed case with Jordyn Sifuentes MD ([facility]). Dr. Jordyn Sifuentes agrees with current care and management of pt and accepts admission.   Admitting Service: Hospital Medicine  Admitting Physician: Dr. Jordyn Sifuentes  Admit to: Inpt med/tele     1:16 AM: Re-evaluated pt. I have discussed test results, shared treatment plan, and the need for admission with patient and family at bedside. Pt and family express understanding at this time and agree with all information. All questions answered. Pt and family have no further questions or concerns at this time. Pt is ready for admit.        Medical Decision Making  Amount and/or Complexity of Data Reviewed  Labs: ordered. Decision-making details documented in ED Course.  Radiology: ordered. Decision-making details documented in ED Course.  ECG/medicine tests: ordered and independent  interpretation performed. Decision-making details documented in ED Course.    Risk  Prescription drug management.  Decision regarding hospitalization.                ED Medication(s):  Medications   sodium chloride 0.9% flush 10 mL (has no administration in time range)   naloxone 0.4 mg/mL injection 0.02 mg (has no administration in time range)   glucose chewable tablet 16 g (has no administration in time range)   glucose chewable tablet 24 g (has no administration in time range)   glucagon (human recombinant) injection 1 mg (has no administration in time range)   ampicillin-sulbactam (UNASYN) 3 g in sodium chloride 0.9 % 100 mL IVPB (MB+) (has no administration in time range)   0.9%  NaCl infusion (has no administration in time range)   morphine injection 2 mg (has no administration in time range)   ketorolac injection 30 mg (has no administration in time range)   acetaminophen tablet 650 mg (has no administration in time range)   ondansetron injection 4 mg (has no administration in time range)   dextrose 10% bolus 125 mL 125 mL (has no administration in time range)   dextrose 10% bolus 250 mL 250 mL (has no administration in time range)   sodium chloride 0.9% bolus 1,000 mL 1,000 mL (0 mLs Intravenous Stopped 5/9/24 2216)   metoprolol injection 5 mg (5 mg Intravenous Given 5/9/24 2118)   adenosine (ADENOCARD) 3 mg/mL injection (  Return to Cabinet 5/9/24 2130)   sodium chloride 0.9% bolus 1,000 mL 1,000 mL (0 mLs Intravenous Stopped 5/9/24 2328)   cefTRIAXone (Rocephin) 1 g in dextrose 5 % in water (D5W) 100 mL IVPB (MB+) (0 g Intravenous Stopped 5/9/24 2342)   sodium chloride 0.9% bolus 1,000 mL 1,000 mL (0 mLs Intravenous Stopped 5/10/24 0129)   ketorolac injection 30 mg (30 mg Intravenous Given 5/10/24 0057)       New Prescriptions    No medications on file               Scribe Attestation:   Scribe #1: I performed the above scribed service and the documentation accurately describes the services I performed. I  attest to the accuracy of the note.     Attending:   Physician Attestation Statement for Scribe #1: IFaye Michelle E, MD, personally performed the services described in this documentation, as scribed by Julio César Soria, in my presence, and it is both accurate and complete.           Clinical Impression       ICD-10-CM ICD-9-CM   1. Pyelonephritis  N12 590.80   2. Tachycardia  R00.0 785.0   3. SVT (supraventricular tachycardia)  I47.10 427.89   4. Chest pain  R07.9 786.50       Disposition:   Disposition: Placed in Observation  Condition: Fair        Lizbet Mckinney MD  05/10/24 0137       Lizbet Mckinney MD  05/10/24 0143

## 2024-05-10 NOTE — AI DETERIORATION ALERT
Artificial Intelligence Notification  [facility]  [id number] [place] [address] [address] [place] [address]  Phone: [phone]    This documentation was triggered by an Artificial Intelligence Notification:    Admit Date: 2024   LOS: 0  Code Status: Full Code  : 2005  Age: 19 y.o.  Weight:   Wt Readings from Last 1 Encounters:   24 58.7 kg (129 lb 6.6 oz)        Sex: female  Bed: A233/A233 A  MRN: [id number]  Attending Physician: Sudhir Munguia MD     Date of Alert: 05/10/2024  Time AI Alert Received: 10:49AM            Vitals:    05/10/24 1032   BP:    Pulse: (!) 168   Resp:    Temp:      SpO2: 97 %      Artificial Intelligence alert discussed with Provider:     Name: Sudhir Munguia   Date/Time of Provider Notification: 5/10/24 @ 10:49AM      Patient Condition: Patient is currently in pain with fever and tachycardia.  Medication adjustments made to help control fever and pain.  Patient was seen and examined by me.

## 2024-05-10 NOTE — ASSESSMENT & PLAN NOTE
This patient does have evidence of infective focus  My overall impression is sepsis.  Source: Urinary Tract  Antibiotics given-   Antibiotics (72h ago, onward)      Start     Stop Route Frequency Ordered    05/10/24 0245  ampicillin-sulbactam (UNASYN) 3 g in sodium chloride 0.9 % 100 mL IVPB (MB+)         -- IV Every 6 hours (non-standard times) 05/10/24 0137          Latest lactate reviewed-  Recent Labs   Lab 05/09/24 2128   LACTATE 1.9     No organ dysfunction at this time  Fluid challenge Not needed - patient is not hypotensive  however, patient did receive 3 L normal saline IV fluid bolus in the emergency department    Post- resuscitation assessment No - Post resuscitation assessment not needed       Will Not start Pressors- Levophed for MAP of 65  Source control achieved by:  IV fluids and IV antibiotics

## 2024-05-10 NOTE — ASSESSMENT & PLAN NOTE
-CO2 19, lactic acid level 1.9  -check serial lactic acid level  -continue IV fluids  -check a.m. labs

## 2024-05-10 NOTE — PROGRESS NOTES
Patient admitted by on-call team this morning for sepsis secondary to pyelonephritis.  Patient noted to still be tachycardic with fever of 101.2.  She continues to have flank pain for which her pain medicines have been adjusted.  Heart rate has improved upon better control of her pain.  Patient's blood pressure did drop down but has began to improve.  She is continued on IV fluids at 150 cc/hour.  Patient has been seen and examined by me with family at bedside.  All questions by the patient as well as her mother have been answered.    Plan:   -continue IV Unasyn  -continue IV fluids  -Tylenol has been adjusted to a 1000 mg q.8 hours p.r.n. for fever greater than 101 to be alternated with ibuprofen 800 mg q.8 hours p.r.n. for fever of greater than 101.  -oxycodone has been added to her pain regimen as well as increased frequency of her morphine.  -urine cultures pending  -blood cultures negative to date

## 2024-05-10 NOTE — HPI
19-year-old white woman with history of depression and anxiety who presented to the emergency department with complaint of low back pain over the last week.  Patient was seen at urgent care this past Tuesday and diagnosed with a pulled muscle in her back.  She was prescribed naproxen and Flexeril with no relief in her symptoms.  Urinalysis was not checked at urgent care.  She has actually had interval worsening and now complains of right flank pain, originally 10/10 on the pain scale, alleviated some with ketorolac 30 mg IV given in the emergency department (current pain level 8/10 on the pain scale).  She has had associated nausea without vomiting, subjective fevers and chills, headache, palpitations, dysuria, and urinary urgency.  She can not identify any aggravating or alleviating factors.  Abnormal labs in our emergency department include white blood cell count 14.15, procalcitonin level 0.85, CO2 19, glucose 131, and urinalysis consistent with infection.  CT scan of abdomen and pelvis shows right pyelonephritis and cystitis.  Patient does give reported allergy to Omnicef however she received ceftriaxone 1 g IV x1 dose in the emergency department with no adverse effects so far.  She is also received a total of 3 L normal saline IV fluid bolus with some minimal improvement in tachycardia.

## 2024-05-10 NOTE — ASSESSMENT & PLAN NOTE
Hyperglycemia most likely related to stress response with underlying infection.  No known history of diabetes mellitus or prediabetes.  Check A1c.

## 2024-05-10 NOTE — PLAN OF CARE
[facility] - Telemetry (Hospital)  Initial Discharge Assessment       Primary Care Provider: [facility]    Admission Diagnosis: SVT (supraventricular tachycardia) [I47.10]  Pyelonephritis [N12]  Tachycardia [R00.0]  Chest pain [R07.9]    Admission Date: 5/9/2024  Expected Discharge Date:     Transition of Care Barriers: Unisured    Payor: /     Extended Emergency Contact Information  Primary Emergency Contact: Brandon Benitez  Mobile Phone: [phone]  Relation: Friend    Discharge Plan A: Home with family         [id number] [place] - [address] AT [address] & [facility]  [address]  [place] [address]  Phone: [phone] Fax: [phone]      Initial Assessment (most recent)       Adult Discharge Assessment - 05/10/24 0854          Discharge Assessment    Assessment Type Discharge Planning Assessment     Confirmed/corrected address, phone number and insurance Yes   Address needs to be updated    Confirmed Demographics Correct on Facesheet     Source of Information patient     Communicated YUVAL with patient/caregiver Date not available/Unable to determine     Reason For Admission Sepsis     People in Home significant other     Facility Arrived From: home     Do you expect to return to your current living situation? Yes     Do you have help at home or someone to help you manage your care at home? Yes     Who are your caregiver(s) and their phone number(s)? Brandon Benitez - boyfriend     Prior to hospitilization cognitive status: Alert/Oriented     Current cognitive status: Alert/Oriented     Walking or Climbing Stairs Difficulty no     Dressing/Bathing Difficulty no     Home Accessibility stairs to enter home     Number of Stairs, Main Entrance two     Surface of Stairs, Main Entrance hardwood     Stair Railings, Main Entrance none     Equipment Currently Used at Home none     Readmission within 30 days? No     Patient currently being followed by outpatient case  management? No     Do you currently have service(s) that help you manage your care at home? No     Do you take prescription medications? Yes     Do you have prescription coverage? No     Do you have any problems affording any of your prescribed medications? No     Is the patient taking medications as prescribed? yes     Who is going to help you get home at discharge? Brandon browneienreyna     How do you get to doctors appointments? car, drives self     Are you on dialysis? No     Do you take coumadin? No     Discharge Plan A Home with family     DME Needed Upon Discharge  none     Discharge Plan discussed with: Patient     Transition of Care Barriers Unisured                   Anticipated DC dispo: home with family  Prior Level of Function: independent with ADLs  People in home: Brandon Emmanuel garcia     Comments:  SW met with patient at bedside to introduce role and discuss discharge planning. Patient's boyfriend will be help at home and can provide transport at time of discharge. Updated demographics, insurance, and emergency contacts. Patient states she was recently denied Medicaid, due to her mother making too much. Patient states she does not have any additional insurance at this time. SW updated Patient's whiteboard with contact information and anticipated DC plan. CM discharge needs depends on hospital progress. SW will continue following to assist with other needs.

## 2024-05-10 NOTE — H&P
[facility] - Emergency Dept.  [facility] Medicine  History & Physical    Patient Name: Eboni Wyatt  MRN: [id number]  Patient Class: IP- Inpatient  Admission Date: 5/9/2024  Attending Physician:  Jordyn Sifuentes MD  Primary Care Provider: [facility], [facility]         Patient information was obtained from patient, ER records, and ER physician .     Subjective:     Principal Problem:Sepsis    Chief Complaint:   Chief Complaint   Patient presents with    Flank Pain     Right sided flank pain since Tuesday. Seen at  Wednesday, dx with pulled muscle in back. No relief with naproxen & flexeril. C/o fever, HA, nausea, heart palpitations, and urinary urgency. 's in triage        HPI: 19-year-old white woman with history of depression and anxiety who presented to the emergency department with complaint of low back pain over the last week.  Patient was seen at urgent care this past Tuesday and diagnosed with a pulled muscle in her back.  She was prescribed naproxen and Flexeril with no relief in her symptoms.  Urinalysis was not checked at urgent care.  She has actually had interval worsening and now complains of right flank pain, originally 10/10 on the pain scale, alleviated some with ketorolac 30 mg IV given in the emergency department (current pain level 8/10 on the pain scale).  She has had associated nausea without vomiting, subjective fevers and chills, headache, palpitations, dysuria, and urinary urgency.  She can not identify any aggravating or alleviating factors.  Abnormal labs in our emergency department include white blood cell count 14.15, procalcitonin level 0.85, CO2 19, glucose 131, and urinalysis consistent with infection.  CT scan of abdomen and pelvis shows right pyelonephritis and cystitis.  Patient does give reported allergy to Omnicef however she received ceftriaxone 1 g IV x1 dose in the emergency department with no adverse effects so far.  She is also received a total of 3 L normal saline IV  fluid bolus with some minimal improvement in tachycardia.    Past Medical History:   Diagnosis Date    Anxiety     Depression        No past surgical history on file.    Review of patient's allergies indicates:   Allergen Reactions    Omnicef [cefdinir] Other (See Comments)     Unknown        No current facility-administered medications on file prior to encounter.     Current Outpatient Medications on File Prior to Encounter   Medication Sig    cyclobenzaprine (FLEXERIL) 10 MG tablet Take 10 mg by mouth.    medroxyprogesterone (DEPO-SUBQ PROVERA) 104 mg/0.65 mL injection Inject 104 mg into the skin every 3 (three) months.    naproxen (NAPROSYN) 500 MG tablet Take 500 mg by mouth 2 (two) times daily as needed.    EScitalopram oxalate (LEXAPRO) 10 MG tablet Take 10 mg by mouth.     Family History       Problem Relation (Age of Onset)    No Known Problems Mother, Father, Sister, Brother          Tobacco Use    Smoking status: Never     Passive exposure: Never    Smokeless tobacco: Never   Substance and Sexual Activity    Alcohol use: Never    Drug use: Never    Sexual activity: Yes     Partners: Male     Birth control/protection: Injection     Review of Systems   Constitutional:  Positive for chills and fever.   Cardiovascular:  Positive for palpitations.   Gastrointestinal:  Positive for nausea. Negative for vomiting.   Genitourinary:  Positive for dysuria, flank pain and urgency.   Musculoskeletal:  Positive for back pain.   Neurological:  Positive for headaches.   All other systems reviewed and are negative.    Objective:     Vital Signs (Most Recent):  Temp: 99 °F (37.2 °C) (05/09/24 2231)  Pulse: (!) 147 (05/10/24 0058)  Resp: (!) 24 (05/10/24 0058)  BP: 119/84 (05/10/24 0059)  SpO2: 99 % (05/10/24 0058) Vital Signs (24h Range):  Temp:  [99 °F (37.2 °C)-99.2 °F (37.3 °C)] 99 °F (37.2 °C)  Pulse:  [126-165] 147  Resp:  [17-24] 24  SpO2:  [99 %-100 %] 99 %  BP: (108-125)/(62-84) 119/84     Weight: 58.7 kg (129 lb  6.6 oz)  Body mass index is 24.45 kg/m².     Physical Exam  Vitals reviewed.   Constitutional:       General: She is not in acute distress.     Appearance: She is ill-appearing. She is not diaphoretic.   HENT:      Nose: Nose normal.   Eyes:      Conjunctiva/sclera: Conjunctivae normal.   Cardiovascular:      Rate and Rhythm: Tachycardia present.   Pulmonary:      Effort: Pulmonary effort is normal. No respiratory distress.   Abdominal:      General: There is no distension.      Tenderness: There is abdominal tenderness. There is no rebound.   Musculoskeletal:         General: No swelling.   Skin:     General: Skin is warm and dry.   Neurological:      Mental Status: She is alert and oriented to person, place, and time.   Psychiatric:         Mood and Affect: Mood normal.         Behavior: Behavior normal.                Significant Labs: All pertinent labs within the past 24 hours have been reviewed.  Recent Lab Results         05/09/24 2227 05/09/24 2128 05/09/24 2121        Procalcitonin     0.85  Comment: A concentration < 0.25 ng/mL represents a low risk of bacterial   infection.  Procalcitonin may not be accurate among patients with localized   infection, recent trauma or major surgery, immunosuppressed state,   invasive fungal infection, renal dysfunction. Decisions regarding   initiation or continuation of antibiotic therapy should not be based   solely on procalcitonin levels.         Albumin     3.6       ALP     102       ALT     23       Anion Gap     12       Appearance, UA Hazy           AST     20       Bacteria, UA Occasional           Baso #     0.02       Basophil %     0.1       Bilirubin (UA) Negative           BILIRUBIN TOTAL     0.5  Comment: For infants and newborns, interpretation of results should be based  on gestational age, weight and in agreement with clinical  observations.    Premature Infant recommended reference ranges:  Up to 24 hours.............<8.0 mg/dL  Up to 48  hours............<12.0 mg/dL  3-5 days..................<15.0 mg/dL  6-29 days.................<15.0 mg/dL         BUN     7       Calcium     9.2       Chloride     106       CO2     19       Color, UA Yellow           Creatinine     0.8       Differential Method     Automated       eGFR     >60       Eos #     0.0       Eos %     0.0       Glucose     131       Glucose, UA Negative           Gran # (ANC)     11.7       Gran %     82.8       Hematocrit     39.8       Hemoglobin     13.4       Hyaline Casts, UA 0           Immature Grans (Abs)     0.07  Comment: Mild elevation in immature granulocytes is non specific and   can be seen in a variety of conditions including stress response,   acute inflammation, trauma and pregnancy. Correlation with other   laboratory and clinical findings is essential.         Immature Granulocytes     0.5       Ketones, UA Negative           Lactic Acid Level   1.9  Comment: Falsely low lactic acid results can be found in samples   containing >=13.0 mg/dL total bilirubin and/or >=3.5 mg/dL   direct bilirubin.           Leukocyte Esterase, UA 3+           Lymph #     1.2       Lymph %     8.3       MCH     30.7       MCHC     33.7       MCV     91       Microscopic Comment SEE COMMENT  Comment: Other formed elements not mentioned in the report are not   present in the microscopic examination.              Mono #     1.2       Mono %     8.3       MPV     9.8       NITRITE UA Negative           nRBC     0       Blood, UA 2+           pH, UA 6.0           Platelet Count     181       Potassium     3.9       hCG Qualitative, Urine Negative           PROTEIN TOTAL     7.0       Protein, UA 1+  Comment: Recommend a 24 hour urine protein or a urine   protein/creatinine ratio if globulin induced proteinuria is  clinically suspected.             RBC     4.36       RBC, UA 16           RDW     12.1       Sodium     137       Specific [place], UA 1.010           Specimen UA Urine, Clean  Catch           Squam Epithel, UA 2           UROBILINOGEN UA Negative           WBC Clumps, UA Many           WBC, UA >100           WBC     14.15       Yeast, UA Few                   Significant Imaging: I have reviewed all pertinent imaging results/findings within the past 24 hours.  CT Renal Stone Study ABD Pelvis WO   Final Result      Right pyelonephritis.      Cystitis.         Electronically signed by: Neno Lawrence   Date:    05/10/2024   Time:    00:14      X-Ray Chest AP Portable   Final Result      No acute abnormality.         Electronically signed by: Tho Aguilar   Date:    05/09/2024   Time:    21:42         Assessment/Plan:     * Sepsis  This patient does have evidence of infective focus  My overall impression is sepsis.  Source: Urinary Tract  Antibiotics given-   Antibiotics (72h ago, onward)      Start     Stop Route Frequency Ordered    05/10/24 0245  ampicillin-sulbactam (UNASYN) 3 g in sodium chloride 0.9 % 100 mL IVPB (MB+)         -- IV Every 6 hours (non-standard times) 05/10/24 0137          Latest lactate reviewed-  Recent Labs   Lab 05/09/24  2128   LACTATE 1.9     No organ dysfunction at this time  Fluid challenge Not needed - patient is not hypotensive  however, patient did receive 3 L normal saline IV fluid bolus in the emergency department    Post- resuscitation assessment No - Post resuscitation assessment not needed       Will Not start Pressors- Levophed for MAP of 65  Source control achieved by:  IV fluids and IV antibiotics    Pyelonephritis  Sepsis with right pyelonephritis  -CT scan of abdomen and pelvis consistent with right-sided pyelonephritis and cystitis  -white blood cell count 14.15, lactic acid level 1.9, hCG negative, procalcitonin level 0.85, urinalysis was consistent with UTI, urine culture pending, and blood cultures pending.  T-max 99.2.  -patient received Rocephin 1 g IV x1 dose in the emergency department; patient with reported history of Omnicef  allergy  -place patient on IV Unasyn  -status post 3 L normal saline IV fluid bolus and we will continue IV fluids with normal saline at 150 mL/hr  -check lactic acid level q.4 hours x2   -check a.m. labs  -p.r.n. pain/nausea control      Sinus tachycardia  Sinus tachycardia most likely related to underlying sepsis with pyelonephritis, and severe pain  -status post 3 L normal saline IV fluid bolus  -continue IV fluids with normal saline at 150 mL/hr  -p.r.n. pain control  -cardiac monitoring  -check TSH and free T4      Hyperglycemia  Hyperglycemia most likely related to stress response with underlying infection.  No known history of diabetes mellitus or prediabetes.  Check A1c.      Acidosis  -CO2 19, lactic acid level 1.9  -check serial lactic acid level  -continue IV fluids  -check a.m. labs      Depression with anxiety  Patient is no longer on antidepressants.  Stable from a psychiatric standpoint.      VTE Risk Mitigation (From admission, onward)           Ordered     IP VTE LOW RISK PATIENT  Once         05/10/24 0137     Place sequential compression device  Until discontinued         05/10/24 0137                                    Jordyn Sifuentes MD  Department of Hospital Medicine  [facility] - Emergency Dept.

## 2024-05-11 LAB
ANION GAP SERPL CALC-SCNC: 10 MMOL/L (ref 8–16)
BASOPHILS # BLD AUTO: 0.05 K/UL (ref 0–0.2)
BASOPHILS NFR BLD: 0.4 % (ref 0–1.9)
BUN SERPL-MCNC: 8 MG/DL (ref 6–20)
CALCIUM SERPL-MCNC: 8.4 MG/DL (ref 8.7–10.5)
CHLORIDE SERPL-SCNC: 112 MMOL/L (ref 95–110)
CO2 SERPL-SCNC: 15 MMOL/L (ref 23–29)
CREAT SERPL-MCNC: 0.8 MG/DL (ref 0.5–1.4)
DIFFERENTIAL METHOD BLD: ABNORMAL
EOSINOPHIL # BLD AUTO: 0 K/UL (ref 0–0.5)
EOSINOPHIL NFR BLD: 0.1 % (ref 0–8)
ERYTHROCYTE [DISTWIDTH] IN BLOOD BY AUTOMATED COUNT: 12.9 % (ref 11.5–14.5)
EST. GFR  (NO RACE VARIABLE): >60 ML/MIN/1.73 M^2
GLUCOSE SERPL-MCNC: 75 MG/DL (ref 70–110)
HCT VFR BLD AUTO: 36.4 % (ref 37–48.5)
HGB BLD-MCNC: 12.1 G/DL (ref 12–16)
IMM GRANULOCYTES # BLD AUTO: 0.18 K/UL (ref 0–0.04)
IMM GRANULOCYTES NFR BLD AUTO: 1.4 % (ref 0–0.5)
LYMPHOCYTES # BLD AUTO: 1.6 K/UL (ref 1–4.8)
LYMPHOCYTES NFR BLD: 12.7 % (ref 18–48)
MCH RBC QN AUTO: 30.9 PG (ref 27–31)
MCHC RBC AUTO-ENTMCNC: 33.2 G/DL (ref 32–36)
MCV RBC AUTO: 93 FL (ref 82–98)
MONOCYTES # BLD AUTO: 1.9 K/UL (ref 0.3–1)
MONOCYTES NFR BLD: 14.9 % (ref 4–15)
NEUTROPHILS # BLD AUTO: 9.1 K/UL (ref 1.8–7.7)
NEUTROPHILS NFR BLD: 70.5 % (ref 38–73)
NRBC BLD-RTO: 0 /100 WBC
PLATELET # BLD AUTO: 134 K/UL (ref 150–450)
PMV BLD AUTO: 10.4 FL (ref 9.2–12.9)
POTASSIUM SERPL-SCNC: 4 MMOL/L (ref 3.5–5.1)
PROCALCITONIN SERPL IA-MCNC: 1.79 NG/ML
RBC # BLD AUTO: 3.92 M/UL (ref 4–5.4)
SODIUM SERPL-SCNC: 137 MMOL/L (ref 136–145)
WBC # BLD AUTO: 12.93 K/UL (ref 3.9–12.7)

## 2024-05-11 PROCEDURE — 84145 PROCALCITONIN (PCT): CPT | Performed by: FAMILY MEDICINE

## 2024-05-11 PROCEDURE — 63600175 PHARM REV CODE 636 W HCPCS: Performed by: INTERNAL MEDICINE

## 2024-05-11 PROCEDURE — 21400001 HC TELEMETRY ROOM

## 2024-05-11 PROCEDURE — 94761 N-INVAS EAR/PLS OXIMETRY MLT: CPT

## 2024-05-11 PROCEDURE — 63600175 PHARM REV CODE 636 W HCPCS: Mod: JZ,JG | Performed by: FAMILY MEDICINE

## 2024-05-11 PROCEDURE — 25000003 PHARM REV CODE 250: Performed by: INTERNAL MEDICINE

## 2024-05-11 PROCEDURE — 25000003 PHARM REV CODE 250: Performed by: FAMILY MEDICINE

## 2024-05-11 PROCEDURE — 27000221 HC OXYGEN, UP TO 24 HOURS

## 2024-05-11 PROCEDURE — 80048 BASIC METABOLIC PNL TOTAL CA: CPT | Performed by: FAMILY MEDICINE

## 2024-05-11 PROCEDURE — P9047 ALBUMIN (HUMAN), 25%, 50ML: HCPCS | Mod: JZ,JG | Performed by: FAMILY MEDICINE

## 2024-05-11 PROCEDURE — 85025 COMPLETE CBC W/AUTO DIFF WBC: CPT | Performed by: FAMILY MEDICINE

## 2024-05-11 PROCEDURE — 25000242 PHARM REV CODE 250 ALT 637 W/ HCPCS: Performed by: FAMILY MEDICINE

## 2024-05-11 PROCEDURE — 36415 COLL VENOUS BLD VENIPUNCTURE: CPT | Performed by: FAMILY MEDICINE

## 2024-05-11 PROCEDURE — 94640 AIRWAY INHALATION TREATMENT: CPT

## 2024-05-11 RX ORDER — SODIUM CHLORIDE 9 MG/ML
INJECTION, SOLUTION INTRAVENOUS
Status: DISCONTINUED | OUTPATIENT
Start: 2024-05-11 | End: 2024-05-13 | Stop reason: HOSPADM

## 2024-05-11 RX ORDER — ALPRAZOLAM 0.5 MG/1
0.5 TABLET ORAL 3 TIMES DAILY PRN
Status: DISCONTINUED | OUTPATIENT
Start: 2024-05-11 | End: 2024-05-13 | Stop reason: HOSPADM

## 2024-05-11 RX ORDER — FUROSEMIDE 10 MG/ML
40 INJECTION INTRAMUSCULAR; INTRAVENOUS ONCE
Status: COMPLETED | OUTPATIENT
Start: 2024-05-11 | End: 2024-05-11

## 2024-05-11 RX ORDER — LEVALBUTEROL INHALATION SOLUTION 0.63 MG/3ML
1.25 SOLUTION RESPIRATORY (INHALATION) EVERY 8 HOURS
Status: COMPLETED | OUTPATIENT
Start: 2024-05-11 | End: 2024-05-12

## 2024-05-11 RX ORDER — ALBUMIN HUMAN 250 G/1000ML
25 SOLUTION INTRAVENOUS ONCE
Status: COMPLETED | OUTPATIENT
Start: 2024-05-11 | End: 2024-05-11

## 2024-05-11 RX ADMIN — LEVALBUTEROL HYDROCHLORIDE 1.25 MG: 0.63 SOLUTION RESPIRATORY (INHALATION) at 03:05

## 2024-05-11 RX ADMIN — AMPICILLIN AND SULBACTAM 3 G: 2; 1 INJECTION, POWDER, FOR SOLUTION INTRAVENOUS at 02:05

## 2024-05-11 RX ADMIN — OXYCODONE HYDROCHLORIDE 10 MG: 5 TABLET ORAL at 01:05

## 2024-05-11 RX ADMIN — AMPICILLIN AND SULBACTAM 3 G: 2; 1 INJECTION, POWDER, FOR SOLUTION INTRAVENOUS at 08:05

## 2024-05-11 RX ADMIN — FUROSEMIDE 40 MG: 10 INJECTION, SOLUTION INTRAMUSCULAR; INTRAVENOUS at 12:05

## 2024-05-11 RX ADMIN — ACETAMINOPHEN 1000 MG: 500 TABLET ORAL at 07:05

## 2024-05-11 RX ADMIN — LEVALBUTEROL HYDROCHLORIDE 1.25 MG: 0.63 SOLUTION RESPIRATORY (INHALATION) at 07:05

## 2024-05-11 RX ADMIN — SODIUM CHLORIDE: 9 INJECTION, SOLUTION INTRAVENOUS at 08:05

## 2024-05-11 RX ADMIN — ALPRAZOLAM 0.5 MG: 0.5 TABLET ORAL at 09:05

## 2024-05-11 RX ADMIN — OXYCODONE HYDROCHLORIDE 10 MG: 5 TABLET ORAL at 04:05

## 2024-05-11 RX ADMIN — POTASSIUM CHLORIDE 40 MEQ: 1500 TABLET, EXTENDED RELEASE ORAL at 08:05

## 2024-05-11 RX ADMIN — ONDANSETRON 4 MG: 2 INJECTION INTRAMUSCULAR; INTRAVENOUS at 11:05

## 2024-05-11 RX ADMIN — OXYCODONE HYDROCHLORIDE 10 MG: 5 TABLET ORAL at 09:05

## 2024-05-11 RX ADMIN — ALBUMIN (HUMAN) 25 G: 5 SOLUTION INTRAVENOUS at 08:05

## 2024-05-11 RX ADMIN — ONDANSETRON 4 MG: 2 INJECTION INTRAMUSCULAR; INTRAVENOUS at 06:05

## 2024-05-11 NOTE — ASSESSMENT & PLAN NOTE
Sinus tachycardia most likely related to underlying sepsis with pyelonephritis, and severe pain  -status post 3 L normal saline IV fluid bolus  -continue IV fluids with normal saline  -p.r.n. pain control  -cardiac monitoring  -check TSH and free T4-reviewed

## 2024-05-11 NOTE — ASSESSMENT & PLAN NOTE
This patient does have evidence of infective focus  My overall impression is sepsis.  Source: Urinary Tract  Antibiotics given-   Antibiotics (72h ago, onward)    Start     Stop Route Frequency Ordered    05/10/24 0245  ampicillin-sulbactam (UNASYN) 3 g in sodium chloride 0.9 % 100 mL IVPB (MB+)         -- IV Every 6 hours (non-standard times) 05/10/24 0137        Latest lactate reviewed-  Recent Labs   Lab 05/10/24  0604   LACTATE 1.4       No organ dysfunction at this time  Fluid challenge Not needed - patient is not hypotensive  however, patient did receive 3 L normal saline IV fluid bolus in the emergency department    Post- resuscitation assessment No - Post resuscitation assessment not needed       Will Not start Pressors- Levophed for MAP of 65  Source control achieved by:  IV fluids and IV antibiotics

## 2024-05-11 NOTE — SUBJECTIVE & OBJECTIVE
Interval History:  Follow up for sepsis secondary to pyelonephritis.  Patient is having shortness of breath this morning.  Her tachycardia has improved and she remains afebrile since her last fever yesterday morning.  Case has been discussed with ICU as there is a concern given her hypotension, tachycardia, increase bicarb, and need for decrease fluids due to pulmonary edema.  Dr. Watson will be reviewing her vitals, imaging, labs to assist with further recommendations.    Review of Systems  Objective:     Vital Signs (Most Recent):  Temp: 100 °F (37.8 °C) (05/11/24 0714)  Pulse: (!) 127 (05/11/24 0727)  Resp: 20 (05/11/24 0727)  BP: 120/84 (05/11/24 0714)  SpO2: 100 % (05/11/24 0727) Vital Signs (24h Range):  Temp:  [98.5 °F (36.9 °C)-100.3 °F (37.9 °C)] 100 °F (37.8 °C)  Pulse:  [112-168] 127  Resp:  [16-20] 20  SpO2:  [95 %-100 %] 100 %  BP: ()/(51-84) 120/84     Weight: 58.4 kg (128 lb 12 oz)  Body mass index is 24.33 kg/m².  No intake or output data in the 24 hours ending 05/11/24 0827      Physical Exam  Vitals and nursing note reviewed.   Constitutional:       Appearance: Normal appearance.   HENT:      Head: Normocephalic and atraumatic.      Nose: Nose normal.      Mouth/Throat:      Mouth: Mucous membranes are moist.   Eyes:      Extraocular Movements: Extraocular movements intact.      Conjunctiva/sclera: Conjunctivae normal.   Cardiovascular:      Rate and Rhythm: Regular rhythm. Tachycardia present.      Pulses: Normal pulses.      Heart sounds: Normal heart sounds.   Pulmonary:      Effort: Pulmonary effort is normal.      Breath sounds: Normal breath sounds.   Abdominal:      General: Abdomen is flat. Bowel sounds are normal.      Palpations: Abdomen is soft.      Tenderness: There is abdominal tenderness. There is right CVA tenderness.   Musculoskeletal:         General: Normal range of motion.      Cervical back: Normal range of motion and neck supple.   Skin:     General: Skin is warm.       Capillary Refill: Capillary refill takes less than 2 seconds.   Neurological:      Mental Status: She is alert and oriented to person, place, and time. Mental status is at baseline.   Psychiatric:         Mood and Affect: Mood normal.         Behavior: Behavior normal.             Significant Labs: All pertinent labs within the past 24 hours have been reviewed.  Recent Lab Results         05/11/24  0544   05/11/24  0543        Procalcitonin   1.79  Comment: A concentration < 0.25 ng/mL represents a low risk of bacterial   infection.  Procalcitonin may not be accurate among patients with localized   infection, recent trauma or major surgery, immunosuppressed state,   invasive fungal infection, renal dysfunction. Decisions regarding   initiation or continuation of antibiotic therapy should not be based   solely on procalcitonin levels.         Anion Gap   10       Baso # 0.05         Basophil % 0.4         BUN   8       Calcium   8.4       Chloride   112       CO2   15       Creatinine   0.8       Differential Method Automated         eGFR   >60       Eos # 0.0         Eos % 0.1         Glucose   75       Gran # (ANC) 9.1         Gran % 70.5         Hematocrit 36.4         Hemoglobin 12.1         Immature Grans (Abs) 0.18  Comment: Mild elevation in immature granulocytes is non specific and   can be seen in a variety of conditions including stress response,   acute inflammation, trauma and pregnancy. Correlation with other   laboratory and clinical findings is essential.           Immature Granulocytes 1.4         Lymph # 1.6         Lymph % 12.7         MCH 30.9         MCHC 33.2         MCV 93         Mono # 1.9         Mono % 14.9         MPV 10.4         nRBC 0         Platelet Count 134         Potassium   4.0       RBC 3.92         RDW 12.9         Sodium   137       WBC 12.93                 Significant Imaging:   X-Ray Chest AP Portable   Final Result      Nonspecific central interstitial prominence and hazy  lower lung opacifications with small left pleural effusion.  Findings may be related to edema or infection.         Electronically signed by: Adam Dominguez   Date:    05/11/2024   Time:    08:17      CT Renal Stone Study ABD Pelvis WO   Final Result      Right pyelonephritis.      Cystitis.         Electronically signed by: Neno Lawrence   Date:    05/10/2024   Time:    00:14      X-Ray Chest AP Portable   Final Result      No acute abnormality.         Electronically signed by: Tho Aguilar   Date:    05/09/2024   Time:    21:42

## 2024-05-11 NOTE — ASSESSMENT & PLAN NOTE
Sepsis with right pyelonephritis  -CT scan of abdomen and pelvis consistent with right-sided pyelonephritis and cystitis  -white blood cell count 14.15, lactic acid level 1.9, hCG negative, procalcitonin level 0.85, urinalysis was consistent with UTI, urine culture pending, and blood cultures pending.  T-max 99.2.  -patient received Rocephin 1 g IV x1 dose in the emergency department; patient with reported history of Omnicef allergy  -place patient on IV Unasyn  -status post 3 L normal saline IV fluid bolus and we will continue IV fluids with normal saline at 150 mL/hr, which was decreased to 75 cc/hour due to volume overload  -check lactic acid level q.4 hours x2   -check a.m. labs  -p.r.n. pain/nausea control

## 2024-05-11 NOTE — PLAN OF CARE
Problem: Adult Inpatient Plan of Care  Goal: Plan of Care Review  Outcome: Progressing  Goal: Patient-Specific Goal (Individualized)  Outcome: Progressing  Goal: Absence of Hospital-Acquired Illness or Injury  Outcome: Progressing  Goal: Optimal Comfort and Wellbeing  Outcome: Progressing  Goal: Readiness for Transition of Care  Outcome: Progressing     Problem: Sepsis/Septic Shock  Goal: Optimal Coping  Outcome: Progressing  Goal: Absence of Bleeding  Outcome: Progressing  Goal: Blood Glucose Level Within Targeted Range  Outcome: Progressing  Goal: Absence of Infection Signs and Symptoms  Outcome: Progressing  Goal: Optimal Nutrition Intake  Outcome: Progressing     Problem: Skin Injury Risk Increased  Goal: Skin Health and Integrity  Outcome: Progressing

## 2024-05-11 NOTE — HOSPITAL COURSE
Patient transferred from [facility] with pyelonephritis.  She has been found to remain tachycardic, hypotensive, and have right-sided flank pain since admission.  She began to have shortness of breath on hospital day 2.  Chest x-ray done showed a small left pleural effusion along with pulmonary edema and therefore IV fluids were dropped from 150 cc to 75 cc, with close monitoring of blood pressure.  Patient was also noted to have a bicarb of 15 which has trended down since admission and a leukocytosis that it initially resolved but increased again.  Case was discussed with the ICU team who will be reviewing her labs, vitals, and imaging to assist with plan of care.  Patient was also placed on Xopenex t.i.d. x1 day to see if it helps with the breathing.  On hospital day 3, patient improved significantly.  Heart rate is now within normal limits, she has not had any additional fevers, she is currently being weaned off oxygen (at 3 L).  Her coli was noted to be resistant to Unasyn, therefore she has been switched to Cipro.  Urine pregnancy test pending prior to starting ciprofloxacin.    Possible discharge tomorrow on oral ciprofloxacin.    5/13- looks and feels much better, rested well over nite, VSS Afeb, eating drinking well, walking a round well. She was seen and examined and deemed stable for discharge home today.

## 2024-05-11 NOTE — PROGRESS NOTES
[facility] Medicine  Progress Note    Patient Name: Eboni Wyatt  MRN: [id number]  Patient Class: IP- Inpatient   Admission Date: 5/9/2024  Length of Stay: 1 days  Attending Physician: Sudhir Munguia MD  Primary Care Provider: [facility]        Subjective:     Principal Problem:Sepsis        HPI:  19-year-old white woman with history of depression and anxiety who presented to the emergency department with complaint of low back pain over the last week.  Patient was seen at urgent care this past Tuesday and diagnosed with a pulled muscle in her back.  She was prescribed naproxen and Flexeril with no relief in her symptoms.  Urinalysis was not checked at urgent care.  She has actually had interval worsening and now complains of right flank pain, originally 10/10 on the pain scale, alleviated some with ketorolac 30 mg IV given in the emergency department (current pain level 8/10 on the pain scale).  She has had associated nausea without vomiting, subjective fevers and chills, headache, palpitations, dysuria, and urinary urgency.  She can not identify any aggravating or alleviating factors.  Abnormal labs in our emergency department include white blood cell count 14.15, procalcitonin level 0.85, CO2 19, glucose 131, and urinalysis consistent with infection.  CT scan of abdomen and pelvis shows right pyelonephritis and cystitis.  Patient does give reported allergy to Omnicef however she received ceftriaxone 1 g IV x1 dose in the emergency department with no adverse effects so far.  She is also received a total of 3 L normal saline IV fluid bolus with some minimal improvement in tachycardia.    Overview/Hospital Course:  Patient transferred from [facility] with pyelonephritis.  She has been found to remain tachycardic, hypotensive, and have right-sided flank pain since admission.  She began to have shortness of breath on hospital day 2.  Chest x-ray done showed a small left pleural  effusion along with pulmonary edema and therefore IV fluids were dropped from 150 cc to 75 cc, with close monitoring of blood pressure.  Patient was also noted to have a bicarb of 15 which has trended down since admission and a leukocytosis that it initially resolved but increased again.  Case was discussed with the ICU team who will be reviewing her labs, vitals, and imaging to assist with plan of care.  Patient was also placed on Xopenex t.i.d. x1 day to see if it helps with the breathing.    Interval History:  Follow up for sepsis secondary to pyelonephritis.  Patient is having shortness of breath this morning.  Her tachycardia has improved and she remains afebrile since her last fever yesterday morning.  Case has been discussed with ICU as there is a concern given her hypotension, tachycardia, increase bicarb, and need for decrease fluids due to pulmonary edema.  Dr. Watson will be reviewing her vitals, imaging, labs to assist with further recommendations.    Review of Systems  Objective:     Vital Signs (Most Recent):  Temp: 100 °F (37.8 °C) (05/11/24 0714)  Pulse: (!) 127 (05/11/24 0727)  Resp: 20 (05/11/24 0727)  BP: 120/84 (05/11/24 0714)  SpO2: 100 % (05/11/24 0727) Vital Signs (24h Range):  Temp:  [98.5 °F (36.9 °C)-100.3 °F (37.9 °C)] 100 °F (37.8 °C)  Pulse:  [112-168] 127  Resp:  [16-20] 20  SpO2:  [95 %-100 %] 100 %  BP: ()/(51-84) 120/84     Weight: 58.4 kg (128 lb 12 oz)  Body mass index is 24.33 kg/m².  No intake or output data in the 24 hours ending 05/11/24 0827      Physical Exam  Vitals and nursing note reviewed.   Constitutional:       Appearance: Normal appearance.   HENT:      Head: Normocephalic and atraumatic.      Nose: Nose normal.      Mouth/Throat:      Mouth: Mucous membranes are moist.   Eyes:      Extraocular Movements: Extraocular movements intact.      Conjunctiva/sclera: Conjunctivae normal.   Cardiovascular:      Rate and Rhythm: Regular rhythm. Tachycardia present.       Pulses: Normal pulses.      Heart sounds: Normal heart sounds.   Pulmonary:      Effort: Pulmonary effort is normal.      Breath sounds: Normal breath sounds.   Abdominal:      General: Abdomen is flat. Bowel sounds are normal.      Palpations: Abdomen is soft.      Tenderness: There is abdominal tenderness. There is right CVA tenderness.   Musculoskeletal:         General: Normal range of motion.      Cervical back: Normal range of motion and neck supple.   Skin:     General: Skin is warm.      Capillary Refill: Capillary refill takes less than 2 seconds.   Neurological:      Mental Status: She is alert and oriented to person, place, and time. Mental status is at baseline.   Psychiatric:         Mood and Affect: Mood normal.         Behavior: Behavior normal.             Significant Labs: All pertinent labs within the past 24 hours have been reviewed.  Recent Lab Results         05/11/24  0544   05/11/24  0543        Procalcitonin   1.79  Comment: A concentration < 0.25 ng/mL represents a low risk of bacterial   infection.  Procalcitonin may not be accurate among patients with localized   infection, recent trauma or major surgery, immunosuppressed state,   invasive fungal infection, renal dysfunction. Decisions regarding   initiation or continuation of antibiotic therapy should not be based   solely on procalcitonin levels.         Anion Gap   10       Baso # 0.05         Basophil % 0.4         BUN   8       Calcium   8.4       Chloride   112       CO2   15       Creatinine   0.8       Differential Method Automated         eGFR   >60       Eos # 0.0         Eos % 0.1         Glucose   75       Gran # (ANC) 9.1         Gran % 70.5         Hematocrit 36.4         Hemoglobin 12.1         Immature Grans (Abs) 0.18  Comment: Mild elevation in immature granulocytes is non specific and   can be seen in a variety of conditions including stress response,   acute inflammation, trauma and pregnancy. Correlation with other    laboratory and clinical findings is essential.           Immature Granulocytes 1.4         Lymph # 1.6         Lymph % 12.7         MCH 30.9         MCHC 33.2         MCV 93         Mono # 1.9         Mono % 14.9         MPV 10.4         nRBC 0         Platelet Count 134         Potassium   4.0       RBC 3.92         RDW 12.9         Sodium   137       WBC 12.93                 Significant Imaging:   X-Ray Chest AP Portable   Final Result      Nonspecific central interstitial prominence and hazy lower lung opacifications with small left pleural effusion.  Findings may be related to edema or infection.         Electronically signed by: Adam Dominguez   Date:    05/11/2024   Time:    08:17      CT Renal Stone Study ABD Pelvis WO   Final Result      Right pyelonephritis.      Cystitis.         Electronically signed by: Neno Lawrence   Date:    05/10/2024   Time:    00:14      X-Ray Chest AP Portable   Final Result      No acute abnormality.         Electronically signed by: Tho Aguilar   Date:    05/09/2024   Time:    21:42           Assessment/Plan:      * Sepsis  This patient does have evidence of infective focus  My overall impression is sepsis.  Source: Urinary Tract  Antibiotics given-   Antibiotics (72h ago, onward)      Start     Stop Route Frequency Ordered    05/10/24 0245  ampicillin-sulbactam (UNASYN) 3 g in sodium chloride 0.9 % 100 mL IVPB (MB+)         -- IV Every 6 hours (non-standard times) 05/10/24 0137          Latest lactate reviewed-  Recent Labs   Lab 05/10/24  0604   LACTATE 1.4       No organ dysfunction at this time  Fluid challenge Not needed - patient is not hypotensive  however, patient did receive 3 L normal saline IV fluid bolus in the emergency department    Post- resuscitation assessment No - Post resuscitation assessment not needed       Will Not start Pressors- Levophed for MAP of 65  Source control achieved by:  IV fluids and IV antibiotics    Depression with  anxiety  Patient is no longer on antidepressants.  Stable from a psychiatric standpoint.    Acidosis  -worsening  -CO2 19, lactic acid level 1.9  -continue IV fluids  -check a.m. labs      Hyperglycemia  Hyperglycemia most likely related to stress response with underlying infection.  No known history of diabetes mellitus or prediabetes.  Check A1c.      Sinus tachycardia  Sinus tachycardia most likely related to underlying sepsis with pyelonephritis, and severe pain  -status post 3 L normal saline IV fluid bolus  -continue IV fluids with normal saline  -p.r.n. pain control  -cardiac monitoring  -check TSH and free T4-reviewed      Pyelonephritis  Sepsis with right pyelonephritis  -CT scan of abdomen and pelvis consistent with right-sided pyelonephritis and cystitis  -white blood cell count 14.15, lactic acid level 1.9, hCG negative, procalcitonin level 0.85, urinalysis was consistent with UTI, urine culture pending, and blood cultures pending.  T-max 99.2.  -patient received Rocephin 1 g IV x1 dose in the emergency department; patient with reported history of Omnicef allergy  -place patient on IV Unasyn  -status post 3 L normal saline IV fluid bolus and we will continue IV fluids with normal saline at 150 mL/hr, which was decreased to 75 cc/hour due to volume overload  -check lactic acid level q.4 hours x2   -check a.m. labs  -p.r.n. pain/nausea control        VTE Risk Mitigation (From admission, onward)           Ordered     IP VTE LOW RISK PATIENT  Once         05/10/24 0137     Place sequential compression device  Until discontinued         05/10/24 0137                    Discharge Planning   YUVAL:      Code Status: Full Code   Is the patient medically ready for discharge?:     Reason for patient still in hospital (select all that apply): Patient trending condition, Laboratory test, Treatment, and Consult recommendations  Discharge Plan A: Home with family                  Sudhir Munguia MD  Department of  [facility] Medicine   '[facility] - Tele[facility] ([facility])

## 2024-05-12 LAB — BACTERIA UR CULT: ABNORMAL

## 2024-05-12 PROCEDURE — 94761 N-INVAS EAR/PLS OXIMETRY MLT: CPT

## 2024-05-12 PROCEDURE — 63600175 PHARM REV CODE 636 W HCPCS: Performed by: INTERNAL MEDICINE

## 2024-05-12 PROCEDURE — 25000242 PHARM REV CODE 250 ALT 637 W/ HCPCS: Performed by: FAMILY MEDICINE

## 2024-05-12 PROCEDURE — 63600175 PHARM REV CODE 636 W HCPCS: Performed by: FAMILY MEDICINE

## 2024-05-12 PROCEDURE — 21400001 HC TELEMETRY ROOM

## 2024-05-12 PROCEDURE — 27000221 HC OXYGEN, UP TO 24 HOURS

## 2024-05-12 PROCEDURE — 25000003 PHARM REV CODE 250: Performed by: FAMILY MEDICINE

## 2024-05-12 PROCEDURE — 94640 AIRWAY INHALATION TREATMENT: CPT

## 2024-05-12 PROCEDURE — 99900035 HC TECH TIME PER 15 MIN (STAT)

## 2024-05-12 PROCEDURE — 25000003 PHARM REV CODE 250: Performed by: INTERNAL MEDICINE

## 2024-05-12 RX ORDER — CIPROFLOXACIN 2 MG/ML
400 INJECTION, SOLUTION INTRAVENOUS
Status: DISCONTINUED | OUTPATIENT
Start: 2024-05-12 | End: 2024-05-13 | Stop reason: HOSPADM

## 2024-05-12 RX ADMIN — OXYCODONE HYDROCHLORIDE 10 MG: 5 TABLET ORAL at 04:05

## 2024-05-12 RX ADMIN — ONDANSETRON 4 MG: 2 INJECTION INTRAMUSCULAR; INTRAVENOUS at 03:05

## 2024-05-12 RX ADMIN — IBUPROFEN 800 MG: 400 TABLET, FILM COATED ORAL at 08:05

## 2024-05-12 RX ADMIN — LEVALBUTEROL HYDROCHLORIDE 1.25 MG: 0.63 SOLUTION RESPIRATORY (INHALATION) at 12:05

## 2024-05-12 RX ADMIN — OXYCODONE HYDROCHLORIDE 10 MG: 5 TABLET ORAL at 07:05

## 2024-05-12 RX ADMIN — AMPICILLIN AND SULBACTAM 3 G: 2; 1 INJECTION, POWDER, FOR SOLUTION INTRAVENOUS at 02:05

## 2024-05-12 RX ADMIN — CIPROFLOXACIN 400 MG: 2 INJECTION, SOLUTION INTRAVENOUS at 02:05

## 2024-05-12 RX ADMIN — AMPICILLIN AND SULBACTAM 3 G: 2; 1 INJECTION, POWDER, FOR SOLUTION INTRAVENOUS at 08:05

## 2024-05-12 RX ADMIN — POTASSIUM CHLORIDE 40 MEQ: 1500 TABLET, EXTENDED RELEASE ORAL at 08:05

## 2024-05-12 NOTE — ASSESSMENT & PLAN NOTE
This patient does have evidence of infective focus  My overall impression is sepsis.  Source: Urinary Tract  Antibiotics given-   Antibiotics (72h ago, onward)    Start     Stop Route Frequency Ordered    05/12/24 1400  ciprofloxacin (CIPRO)400mg/200ml D5W IVPB 400 mg         -- IV Every 12 hours (non-standard times) 05/12/24 1253        Latest lactate reviewed-  Recent Labs   Lab 05/10/24  0604   LACTATE 1.4       No organ dysfunction at this time  Fluid challenge Not needed - patient is not hypotensive  however, patient did receive 3 L normal saline IV fluid bolus in the emergency department    Post- resuscitation assessment No - Post resuscitation assessment not needed       Will Not start Pressors- Levophed for MAP of 65  Source control achieved by:  IV fluids and IV antibiotics

## 2024-05-12 NOTE — PLAN OF CARE
Problem: Adult Inpatient Plan of Care  Goal: Absence of Hospital-Acquired Illness or Injury  Outcome: Progressing     Problem: Sepsis/Septic Shock  Goal: Optimal Coping  Outcome: Progressing  Goal: Absence of Bleeding  Outcome: Progressing     Problem: Skin Injury Risk Increased  Goal: Skin Health and Integrity  Outcome: Progressing

## 2024-05-12 NOTE — SUBJECTIVE & OBJECTIVE
Interval History:  Follow up for pyelonephritis.  Patient's urine cultures positive for E coli resistant to Unasyn, therefore antibiotic has been changed to ciprofloxacin.  We will check a PT prior to initiation of ciprofloxacin.  Patient's only complaint is headache.  Plan of care discussed with patient and her  at the bedside.    Review of Systems  Objective:     Vital Signs (Most Recent):  Temp: 98.8 °F (37.1 °C) (05/12/24 1208)  Pulse: 92 (05/12/24 1208)  Resp: 18 (05/12/24 1208)  BP: 114/85 (05/12/24 1208)  SpO2: 97 % (05/12/24 1208) Vital Signs (24h Range):  Temp:  [97.2 °F (36.2 °C)-98.9 °F (37.2 °C)] 98.8 °F (37.1 °C)  Pulse:  [] 92  Resp:  [6-20] 18  SpO2:  [96 %-100 %] 97 %  BP: ()/(53-85) 114/85     Weight: 58.4 kg (128 lb 12 oz)  Body mass index is 24.33 kg/m².    Intake/Output Summary (Last 24 hours) at 5/12/2024 1256  Last data filed at 5/12/2024 0033  Gross per 24 hour   Intake --   Output 2400 ml   Net -2400 ml         Physical Exam  Vitals and nursing note reviewed.   Constitutional:       Appearance: Normal appearance.   HENT:      Head: Normocephalic and atraumatic.      Nose: Nose normal.      Mouth/Throat:      Mouth: Mucous membranes are moist.   Eyes:      Extraocular Movements: Extraocular movements intact.      Conjunctiva/sclera: Conjunctivae normal.   Cardiovascular:      Rate and Rhythm: Normal rate and regular rhythm.      Pulses: Normal pulses.      Heart sounds: Normal heart sounds.   Pulmonary:      Effort: Pulmonary effort is normal.      Breath sounds: Normal breath sounds.   Abdominal:      General: Abdomen is flat. Bowel sounds are normal.      Palpations: Abdomen is soft.   Musculoskeletal:         General: Normal range of motion.      Cervical back: Normal range of motion and neck supple.   Skin:     General: Skin is warm.      Capillary Refill: Capillary refill takes less than 2 seconds.   Neurological:      Mental Status: She is alert and oriented to person,  "place, and time. Mental status is at baseline.   Psychiatric:         Mood and Affect: Mood normal.         Behavior: Behavior normal.             Significant Labs: All pertinent labs within the past 24 hours have been reviewed.  BMP:   Recent Labs   Lab 05/11/24  0543   GLU 75      K 4.0   *   CO2 15*   BUN 8   CREATININE 0.8   CALCIUM 8.4*     CBC:   Recent Labs   Lab 05/11/24  0544   WBC 12.93*   HGB 12.1   HCT 36.4*   *     Urine Culture: No results for input(s): "LABURIN" in the last 48 hours.  Urine Studies: No results for input(s): "COLORU", "APPEARANCEUA", "PHUR", "SPECGRAV", "PROTEINUA", "GLUCUA", "KETONESU", "BILIRUBINUA", "OCCULTUA", "NITRITE", "UROBILINOGEN", "LEUKOCYTESUR", "RBCUA", "WBCUA", "BACTERIA", "SQUAMEPITHEL", "HYALINECASTS" in the last 48 hours.    Invalid input(s): "WRIGHTSUR"    Significant Imaging: I have reviewed all pertinent imaging results/findings within the past 24 hours.  "

## 2024-05-12 NOTE — PROGRESS NOTES
[facility] Medicine  Progress Note    Patient Name: Eboni Wyatt  MRN: [id number]  Patient Class: IP- Inpatient   Admission Date: 5/9/2024  Length of Stay: 2 days  Attending Physician: Sudhir Munguia MD  Primary Care Provider: [facility]        Subjective:     Principal Problem:Sepsis        HPI:  19-year-old white woman with history of depression and anxiety who presented to the emergency department with complaint of low back pain over the last week.  Patient was seen at urgent care this past Tuesday and diagnosed with a pulled muscle in her back.  She was prescribed naproxen and Flexeril with no relief in her symptoms.  Urinalysis was not checked at urgent care.  She has actually had interval worsening and now complains of right flank pain, originally 10/10 on the pain scale, alleviated some with ketorolac 30 mg IV given in the emergency department (current pain level 8/10 on the pain scale).  She has had associated nausea without vomiting, subjective fevers and chills, headache, palpitations, dysuria, and urinary urgency.  She can not identify any aggravating or alleviating factors.  Abnormal labs in our emergency department include white blood cell count 14.15, procalcitonin level 0.85, CO2 19, glucose 131, and urinalysis consistent with infection.  CT scan of abdomen and pelvis shows right pyelonephritis and cystitis.  Patient does give reported allergy to Omnicef however she received ceftriaxone 1 g IV x1 dose in the emergency department with no adverse effects so far.  She is also received a total of 3 L normal saline IV fluid bolus with some minimal improvement in tachycardia.    Overview/Hospital Course:  Patient transferred from [facility] with pyelonephritis.  She has been found to remain tachycardic, hypotensive, and have right-sided flank pain since admission.  She began to have shortness of breath on hospital day 2.  Chest x-ray done showed a small left pleural  effusion along with pulmonary edema and therefore IV fluids were dropped from 150 cc to 75 cc, with close monitoring of blood pressure.  Patient was also noted to have a bicarb of 15 which has trended down since admission and a leukocytosis that it initially resolved but increased again.  Case was discussed with the ICU team who will be reviewing her labs, vitals, and imaging to assist with plan of care.  Patient was also placed on Xopenex t.i.d. x1 day to see if it helps with the breathing.  On hospital day 3, patient improved significantly.  Heart rate is now within normal limits, she has not had any additional fevers, she is currently being weaned off oxygen (at 3 L).  Her coli was noted to be resistant to Unasyn, therefore she has been switched to Cipro.  Urine pregnancy test pending prior to starting ciprofloxacin.    Possible discharge tomorrow on oral ciprofloxacin    Interval History:  Follow up for pyelonephritis.  Patient's urine cultures positive for E coli resistant to Unasyn, therefore antibiotic has been changed to ciprofloxacin.  We will check a PT prior to initiation of ciprofloxacin.  Patient's only complaint is headache.  Plan of care discussed with patient and her  at the bedside.    Review of Systems  Objective:     Vital Signs (Most Recent):  Temp: 98.8 °F (37.1 °C) (05/12/24 1208)  Pulse: 92 (05/12/24 1208)  Resp: 18 (05/12/24 1208)  BP: 114/85 (05/12/24 1208)  SpO2: 97 % (05/12/24 1208) Vital Signs (24h Range):  Temp:  [97.2 °F (36.2 °C)-98.9 °F (37.2 °C)] 98.8 °F (37.1 °C)  Pulse:  [] 92  Resp:  [6-20] 18  SpO2:  [96 %-100 %] 97 %  BP: ()/(53-85) 114/85     Weight: 58.4 kg (128 lb 12 oz)  Body mass index is 24.33 kg/m².    Intake/Output Summary (Last 24 hours) at 5/12/2024 1256  Last data filed at 5/12/2024 0033  Gross per 24 hour   Intake --   Output 2400 ml   Net -2400 ml         Physical Exam  Vitals and nursing note reviewed.   Constitutional:       Appearance: Normal  "appearance.   HENT:      Head: Normocephalic and atraumatic.      Nose: Nose normal.      Mouth/Throat:      Mouth: Mucous membranes are moist.   Eyes:      Extraocular Movements: Extraocular movements intact.      Conjunctiva/sclera: Conjunctivae normal.   Cardiovascular:      Rate and Rhythm: Normal rate and regular rhythm.      Pulses: Normal pulses.      Heart sounds: Normal heart sounds.   Pulmonary:      Effort: Pulmonary effort is normal.      Breath sounds: Normal breath sounds.   Abdominal:      General: Abdomen is flat. Bowel sounds are normal.      Palpations: Abdomen is soft.   Musculoskeletal:         General: Normal range of motion.      Cervical back: Normal range of motion and neck supple.   Skin:     General: Skin is warm.      Capillary Refill: Capillary refill takes less than 2 seconds.   Neurological:      Mental Status: She is alert and oriented to person, place, and time. Mental status is at baseline.   Psychiatric:         Mood and Affect: Mood normal.         Behavior: Behavior normal.             Significant Labs: All pertinent labs within the past 24 hours have been reviewed.  BMP:   Recent Labs   Lab 05/11/24  0543   GLU 75      K 4.0   *   CO2 15*   BUN 8   CREATININE 0.8   CALCIUM 8.4*     CBC:   Recent Labs   Lab 05/11/24  0544   WBC 12.93*   HGB 12.1   HCT 36.4*   *     Urine Culture: No results for input(s): "LABURIN" in the last 48 hours.  Urine Studies: No results for input(s): "COLORU", "APPEARANCEUA", "PHUR", "SPECGRAV", "PROTEINUA", "GLUCUA", "KETONESU", "BILIRUBINUA", "OCCULTUA", "NITRITE", "UROBILINOGEN", "LEUKOCYTESUR", "RBCUA", "WBCUA", "BACTERIA", "SQUAMEPITHEL", "HYALINECASTS" in the last 48 hours.    Invalid input(s): "WRIGHTSUR"    Significant Imaging: I have reviewed all pertinent imaging results/findings within the past 24 hours.    Assessment/Plan:      * Sepsis  This patient does have evidence of infective focus  My overall impression is " sepsis.  Source: Urinary Tract  Antibiotics given-   Antibiotics (72h ago, onward)      Start     Stop Route Frequency Ordered    05/12/24 1400  ciprofloxacin (CIPRO)400mg/200ml D5W IVPB 400 mg         -- IV Every 12 hours (non-standard times) 05/12/24 1253          Latest lactate reviewed-  Recent Labs   Lab 05/10/24  0604   LACTATE 1.4       No organ dysfunction at this time  Fluid challenge Not needed - patient is not hypotensive  however, patient did receive 3 L normal saline IV fluid bolus in the emergency department    Post- resuscitation assessment No - Post resuscitation assessment not needed       Will Not start Pressors- Levophed for MAP of 65  Source control achieved by:  IV fluids and IV antibiotics    Depression with anxiety  Patient is no longer on antidepressants.  Stable from a psychiatric standpoint.    -Xanax p.r.n.    Acidosis  -worsening  -CO2 19, lactic acid level 1.9  -continue IV fluids  -check a.m. labs      Hyperglycemia  Hyperglycemia most likely related to stress response with underlying infection.  No known history of diabetes mellitus or prediabetes.      -A1c 4.8    Sinus tachycardia  Sinus tachycardia most likely related to underlying sepsis with pyelonephritis, and severe pain  -status post 3 L normal saline IV fluid bolus  -continue IV fluids with normal saline  -p.r.n. pain control  -cardiac monitoring  -check TSH and free T4-reviewed      Pyelonephritis  Sepsis with right pyelonephritis  -CT scan of abdomen and pelvis consistent with right-sided pyelonephritis and cystitis  -white blood cell count 14.15, lactic acid level 1.9, hCG negative, procalcitonin level 0.85, urinalysis was consistent with UTI, urine culture pending, and blood cultures pending.  T-max 99.2.  -patient received Rocephin 1 g IV x1 dose in the emergency department; patient with reported history of Omnicef allergy  -place patient on IV Unasyn  -status post 3 L normal saline IV fluid bolus and we will continue IV  fluids with normal saline at 150 mL/hr, which was decreased to 75 cc/hour due to volume overload  -check lactic acid level q.4 hours x2   -check a.m. labs  -p.r.n. pain/nausea control        VTE Risk Mitigation (From admission, onward)           Ordered     IP VTE LOW RISK PATIENT  Once         05/10/24 0137     Place sequential compression device  Until discontinued         05/10/24 0137                    Discharge Planning   YUVAL:      Code Status: Full Code   Is the patient medically ready for discharge?:     Reason for patient still in hospital (select all that apply): Patient trending condition, Laboratory test, and Treatment  Discharge Plan A: Home with family                  Sudhir Munguia MD  Department of Hospital Medicine   [facility])

## 2024-05-12 NOTE — ASSESSMENT & PLAN NOTE
Hyperglycemia most likely related to stress response with underlying infection.  No known history of diabetes mellitus or prediabetes.      -A1c 4.8

## 2024-05-13 VITALS
WEIGHT: 128.75 LBS | BODY MASS INDEX: 24.31 KG/M2 | DIASTOLIC BLOOD PRESSURE: 82 MMHG | SYSTOLIC BLOOD PRESSURE: 120 MMHG | TEMPERATURE: 98 F | HEIGHT: 61 IN | RESPIRATION RATE: 18 BRPM | HEART RATE: 106 BPM | OXYGEN SATURATION: 99 %

## 2024-05-13 PROBLEM — N12 PYELONEPHRITIS: Status: RESOLVED | Noted: 2024-05-10 | Resolved: 2024-05-13

## 2024-05-13 PROBLEM — R73.9 HYPERGLYCEMIA: Status: RESOLVED | Noted: 2024-05-10 | Resolved: 2024-05-13

## 2024-05-13 PROBLEM — A41.9 SEPSIS: Status: RESOLVED | Noted: 2024-05-10 | Resolved: 2024-05-13

## 2024-05-13 PROBLEM — E87.20 ACIDOSIS: Status: RESOLVED | Noted: 2024-05-10 | Resolved: 2024-05-13

## 2024-05-13 PROBLEM — R00.0 SINUS TACHYCARDIA: Status: RESOLVED | Noted: 2024-05-10 | Resolved: 2024-05-13

## 2024-05-13 LAB
ANION GAP SERPL CALC-SCNC: 10 MMOL/L (ref 8–16)
BASOPHILS # BLD AUTO: 0.03 K/UL (ref 0–0.2)
BASOPHILS NFR BLD: 0.3 % (ref 0–1.9)
BUN SERPL-MCNC: 5 MG/DL (ref 6–20)
CALCIUM SERPL-MCNC: 8.6 MG/DL (ref 8.7–10.5)
CHLORIDE SERPL-SCNC: 105 MMOL/L (ref 95–110)
CO2 SERPL-SCNC: 22 MMOL/L (ref 23–29)
CREAT SERPL-MCNC: 0.7 MG/DL (ref 0.5–1.4)
DIFFERENTIAL METHOD BLD: ABNORMAL
EOSINOPHIL # BLD AUTO: 0 K/UL (ref 0–0.5)
EOSINOPHIL NFR BLD: 0.4 % (ref 0–8)
ERYTHROCYTE [DISTWIDTH] IN BLOOD BY AUTOMATED COUNT: 12.8 % (ref 11.5–14.5)
EST. GFR  (NO RACE VARIABLE): >60 ML/MIN/1.73 M^2
GLUCOSE SERPL-MCNC: 82 MG/DL (ref 70–110)
HCT VFR BLD AUTO: 28.7 % (ref 37–48.5)
HGB BLD-MCNC: 9.8 G/DL (ref 12–16)
IMM GRANULOCYTES # BLD AUTO: 0.12 K/UL (ref 0–0.04)
IMM GRANULOCYTES NFR BLD AUTO: 1.1 % (ref 0–0.5)
LYMPHOCYTES # BLD AUTO: 1.4 K/UL (ref 1–4.8)
LYMPHOCYTES NFR BLD: 13.1 % (ref 18–48)
MCH RBC QN AUTO: 30.2 PG (ref 27–31)
MCHC RBC AUTO-ENTMCNC: 34.1 G/DL (ref 32–36)
MCV RBC AUTO: 88 FL (ref 82–98)
MONOCYTES # BLD AUTO: 1.1 K/UL (ref 0.3–1)
MONOCYTES NFR BLD: 10.6 % (ref 4–15)
NEUTROPHILS # BLD AUTO: 8 K/UL (ref 1.8–7.7)
NEUTROPHILS NFR BLD: 74.5 % (ref 38–73)
NRBC BLD-RTO: 0 /100 WBC
PLATELET # BLD AUTO: 233 K/UL (ref 150–450)
PLATELET BLD QL SMEAR: ABNORMAL
PMV BLD AUTO: 9.9 FL (ref 9.2–12.9)
POTASSIUM SERPL-SCNC: 3.1 MMOL/L (ref 3.5–5.1)
RBC # BLD AUTO: 3.25 M/UL (ref 4–5.4)
SODIUM SERPL-SCNC: 137 MMOL/L (ref 136–145)
WBC # BLD AUTO: 10.76 K/UL (ref 3.9–12.7)

## 2024-05-13 PROCEDURE — 63600175 PHARM REV CODE 636 W HCPCS: Performed by: FAMILY MEDICINE

## 2024-05-13 PROCEDURE — 85025 COMPLETE CBC W/AUTO DIFF WBC: CPT | Performed by: FAMILY MEDICINE

## 2024-05-13 PROCEDURE — 80048 BASIC METABOLIC PNL TOTAL CA: CPT | Performed by: FAMILY MEDICINE

## 2024-05-13 PROCEDURE — 25000003 PHARM REV CODE 250: Performed by: EMERGENCY MEDICINE

## 2024-05-13 PROCEDURE — 25000003 PHARM REV CODE 250: Performed by: FAMILY MEDICINE

## 2024-05-13 PROCEDURE — 94761 N-INVAS EAR/PLS OXIMETRY MLT: CPT

## 2024-05-13 PROCEDURE — 36415 COLL VENOUS BLD VENIPUNCTURE: CPT | Performed by: FAMILY MEDICINE

## 2024-05-13 RX ORDER — CIPROFLOXACIN 500 MG/1
500 TABLET ORAL EVERY 12 HOURS
Qty: 6 TABLET | Refills: 0 | Status: SHIPPED | OUTPATIENT
Start: 2024-05-13

## 2024-05-13 RX ORDER — CIPROFLOXACIN 500 MG/1
500 TABLET ORAL EVERY 12 HOURS
Qty: 6 TABLET | Refills: 0 | Status: SHIPPED | OUTPATIENT
Start: 2024-05-13 | End: 2024-05-13

## 2024-05-13 RX ADMIN — CIPROFLOXACIN 400 MG: 2 INJECTION, SOLUTION INTRAVENOUS at 01:05

## 2024-05-13 RX ADMIN — POTASSIUM BICARBONATE 50 MEQ: 978 TABLET, EFFERVESCENT ORAL at 10:05

## 2024-05-13 RX ADMIN — OXYCODONE HYDROCHLORIDE 10 MG: 5 TABLET ORAL at 03:05

## 2024-05-13 NOTE — PLAN OF CARE
[facility] - Telemetry (Hospital)  Discharge Final Note    Primary Care Provider: [facility]    Expected Discharge Date: 5/13/2024    Final Discharge Note (most recent)       Final Note - 05/13/24 1132          Final Note    Assessment Type Final Discharge Note     Anticipated Discharge Disposition Home or Self Care     Hospital Resources/Appts/Education Provided Post-Acute resouces added to AVS        Post-Acute Status    Discharge Delays None known at this time                   Pt to discharge home today. No CM needs/consults for discharge.  AVS updated with PCP community resources d/t pt being uninsured/Medicaid pending.     Important Message from Medicare             Contact Info       [facility]   Specialty: Psychology   Relationship: PCP - General    [address]  [place] [address]   Phone: [phone]       Next Steps: Follow up    [place] [id number] [facility] [place] [address]   Phone: [phone]       Next Steps: Follow up

## 2024-05-13 NOTE — PLAN OF CARE
Telemetry monitor removed and returned to monitor tech. PIV removed. Discharge instructions reviewed with patient including discharge medications, follow-up appointments, diet, and activity restrictions. Patient verbalizes understanding and voices no concerns. All personal belongings to leave with patient. Awaiting ride home.

## 2024-05-15 LAB
BACTERIA BLD CULT: NORMAL
BACTERIA BLD CULT: NORMAL

## 2024-05-18 NOTE — DISCHARGE SUMMARY
[facility]  Discharge Summary      Patient Name: Eboni Wyatt  MRN: [id number]  [facility]: [id number]  Patient Class: IP- Inpatient  Admission Date: 5/9/2024  Hospital Length of Stay: 3 days  Discharge Date and Time: 5/13/2024 12:12 PM  Attending Physician: No att. providers found   Discharging Provider: Marcelino Garcia MD  Primary Care Provider: [facility], [facility]    Primary Care Team: Networked reference to record PCT     HPI:   19-year-old white woman with history of depression and anxiety who presented to the emergency department with complaint of low back pain over the last week.  Patient was seen at urgent care this past Tuesday and diagnosed with a pulled muscle in her back.  She was prescribed naproxen and Flexeril with no relief in her symptoms.  Urinalysis was not checked at urgent care.  She has actually had interval worsening and now complains of right flank pain, originally 10/10 on the pain scale, alleviated some with ketorolac 30 mg IV given in the emergency department (current pain level 8/10 on the pain scale).  She has had associated nausea without vomiting, subjective fevers and chills, headache, palpitations, dysuria, and urinary urgency.  She can not identify any aggravating or alleviating factors.  Abnormal labs in our emergency department include white blood cell count 14.15, procalcitonin level 0.85, CO2 19, glucose 131, and urinalysis consistent with infection.  CT scan of abdomen and pelvis shows right pyelonephritis and cystitis.  Patient does give reported allergy to Omnicef however she received ceftriaxone 1 g IV x1 dose in the emergency department with no adverse effects so far.  She is also received a total of 3 L normal saline IV fluid bolus with some minimal improvement in tachycardia.    * No surgery found *      Hospital Course:   Patient transferred from [facility] with pyelonephritis.  She has been found to remain tachycardic, hypotensive, and have  right-sided flank pain since admission.  She began to have shortness of breath on hospital day 2.  Chest x-ray done showed a small left pleural effusion along with pulmonary edema and therefore IV fluids were dropped from 150 cc to 75 cc, with close monitoring of blood pressure.  Patient was also noted to have a bicarb of 15 which has trended down since admission and a leukocytosis that it initially resolved but increased again.  Case was discussed with the ICU team who will be reviewing her labs, vitals, and imaging to assist with plan of care.  Patient was also placed on Xopenex t.i.d. x1 day to see if it helps with the breathing.  On hospital day 3, patient improved significantly.  Heart rate is now within normal limits, she has not had any additional fevers, she is currently being weaned off oxygen (at 3 L).  Her coli was noted to be resistant to Unasyn, therefore she has been switched to Cipro.  Urine pregnancy test pending prior to starting ciprofloxacin.    Possible discharge tomorrow on oral ciprofloxacin.    5/13- looks and feels much better, rested well over nite, VSS Afeb, eating drinking well, walking a round well. She was seen and examined and deemed stable for discharge home today.          Goals of Care Treatment Preferences:  Code Status: Full Code      Consults:     No new Assessment & Plan notes have been filed under this hospital service since the last note was generated.  Service: Hospital Medicine    Final Active Diagnoses:    Diagnosis Date Noted POA    Depression with anxiety [F41.8] 05/10/2024 Yes      Problems Resolved During this Admission:    Diagnosis Date Noted Date Resolved POA    PRINCIPAL PROBLEM:  Sepsis [A41.9] 05/10/2024 05/13/2024 Yes    Pyelonephritis [N12] 05/10/2024 05/13/2024 Yes    Sinus tachycardia [R00.0] 05/10/2024 05/13/2024 Yes    Hyperglycemia [R73.9] 05/10/2024 05/13/2024 Yes    Acidosis [E87.20] 05/10/2024 05/13/2024 Yes       Discharged Condition:  stable    Disposition: Home or Self Care    Follow Up:   Follow-up Information       [facility], [facility] Follow up.    Specialty: Psychology  Contact information:  [address] [place] [address] [phone]               Clinic, [facility] Follow up.    Contact information:  [address]  [place] [address] [phone]                           Patient Instructions:      Diet Adult Regular     Activity as tolerated       Significant Diagnostic Studies: Microbiology: Urine Culture    Lab Results   Component Value Date    LABURIN (A) 05/09/2024     ESCHERICHIA COLI  10,000 - 49,999 cfu/ml  No other significant isolate       Radiology:   Imaging Results              CT Renal Stone Study ABD Pelvis WO (Final result)  Result time 05/10/24 00:14:41      Final result by Neno Lawrence MD (05/10/24 00:14:41)                   Impression:      Right pyelonephritis.    Cystitis.      Electronically signed by: Neno Lawrence  Date:    05/10/2024  Time:    00:14               Narrative:    EXAMINATION:  CT RENAL STONE STUDY ABD PELVIS WO    CLINICAL HISTORY:  Flank pain, kidney stone suspected;    TECHNIQUE:  Low dose axial images, sagittal and coronal reformations were obtained from the lung bases to the pubic symphysis, Oral contrast was not administered.    COMPARISON:  None    FINDINGS:  Heart: Normal in size. No pericardial effusion.    Lung Bases: Well aerated, without consolidation or pleural fluid.    Liver: Normal in size and attenuation, with no focal hepatic lesions.    Gallbladder: No calcified gallstones.    Bile Ducts: No evidence of dilated ducts.    Pancreas: No mass or peripancreatic fat stranding.    Spleen: Unremarkable.    Adrenals: Unremarkable.    Kidneys/ Ureters: Mild right hydroureteronephrosis with perinephric and periureteral infectious/inflammatory edema.  No obstructive renal stone.    Bladder: Bladder wall thickening.    Reproductive organs: Unremarkable.    GI  Tract/Mesentery: No evidence of bowel obstruction or inflammation.    Peritoneal Space: No ascites. No free air.    Retroperitoneum: No significant adenopathy.    Abdominal wall: Unremarkable.    Vasculature: No significant atherosclerosis or aneurysm.    Bones: No acute fracture.                                       X-Ray Chest AP Portable (Final result)  Result time 05/09/24 21:42:13      Final result by Tho Aguilar MD (05/09/24 21:42:13)                   Impression:      No acute abnormality.      Electronically signed by: Tho Aguilar  Date:    05/09/2024  Time:    21:42               Narrative:    EXAMINATION:  XR CHEST AP PORTABLE    CLINICAL HISTORY:  Sepsis;    TECHNIQUE:  Single frontal view of the chest was performed.    COMPARISON:  None    FINDINGS:  Overlying wires and defibrillator pads noted.The lungs are clear, with normal appearance of pulmonary vasculature and no pleural effusion or pneumothorax.    The cardiac silhouette is normal in size. The hilar and mediastinal contours are unremarkable.    Bones are intact.                                       Pending Diagnostic Studies:       None           Medications:  Reconciled Home Medications:      Medication List        CONTINUE taking these medications      cyclobenzaprine 10 MG tablet  Commonly known as: FLEXERIL  Take 10 mg by mouth.     EScitalopram oxalate 10 MG tablet  Commonly known as: LEXAPRO  Take 10 mg by mouth.     medroxyPROGESTERone 104 mg/0.65 mL injection  Commonly known as: DEPO-SUBQ PROVERA  Inject 104 mg into the skin every 3 (three) months.            STOP taking these medications      naproxen 500 MG tablet  Commonly known as: NAPROSYN              Indwelling Lines/Drains at time of discharge:   Lines/Drains/Airways       None                   Time spent on the discharge of patient: 37 minutes         Marcelino Garcia MD  Department of Hospital Medicine  '[facility])

## 2024-08-13 NOTE — PROGRESS NOTES
"Subjective:      Patient ID: Eboni Wyatt is a 18 y.o. female.    Vitals:  height is 5' 1" (1.549 m) and weight is 56.2 kg (124 lb). Her oral temperature is 98.9 °F (37.2 °C). Her blood pressure is 108/77 and her pulse is 112 (abnormal). Her respiration is 18 and oxygen saturation is 98%.     Chief Complaint: Sore Throat    Pt is coming in for sore throat, upset stomach and body aches. PT thinks it began Thursday or Friday.     Sore Throat   This is a new problem. The current episode started in the past 7 days. The problem has been unchanged. Neither side of throat is experiencing more pain than the other. There has been no fever (99.3). The fever has been present for Less than 1 day. The pain is at a severity of 8/10. The pain is moderate. Associated symptoms include coughing, ear pain and trouble swallowing. Pertinent negatives include no congestion. She has had no exposure to mono. She has tried nothing for the symptoms. The treatment provided no relief.       HENT:  Positive for ear pain, sore throat and trouble swallowing. Negative for congestion.    Respiratory:  Positive for cough.       Objective:     Physical Exam   Constitutional: She is oriented to person, place, and time. She appears well-developed. She is cooperative.  Non-toxic appearance. She does not appear ill. No distress.   HENT:   Head: Normocephalic and atraumatic.   Ears:   Right Ear: Hearing, tympanic membrane, external ear and ear canal normal. impacted cerumen  Left Ear: Hearing, tympanic membrane, external ear and ear canal normal. impacted cerumen  Nose: No rhinorrhea or congestion.   Mouth/Throat: Uvula is midline, oropharynx is clear and moist and mucous membranes are normal. Mucous membranes are moist. Mucous membranes are not dry. No trismus in the jaw. No uvula swelling. No oropharyngeal exudate, posterior oropharyngeal edema, posterior oropharyngeal erythema, tonsillar abscesses or cobblestoning. Oropharynx is clear.   Eyes: " Pulmonology:  Dr. Bakari Cervantes  [facility]  [address] [place] [address]  Office: [phone]    Peripheral neuropathy:  Dr. Jacob Cobb  [facility]  [address] [address]  Office: [phone]   Conjunctivae and lids are normal. No scleral icterus.   Neck: Phonation normal. Neck supple.   Cardiovascular: Regular rhythm and normal heart sounds. Tachycardia present.   No murmur heard.Exam reveals no gallop.   Pulmonary/Chest: Effort normal and breath sounds normal. No stridor. No respiratory distress. She has no wheezes. She has no rhonchi. She has no rales.   Abdominal: Normal appearance.   Lymphadenopathy:     She has cervical adenopathy.        Right cervical: Superficial cervical adenopathy present.        Left cervical: Superficial cervical adenopathy present.   Neurological: She is alert and oriented to person, place, and time. Coordination normal.   Skin: Skin is intact, not diaphoretic and not pale.   Psychiatric: Her speech is normal and behavior is normal. Judgment and thought content normal.   Nursing note and vitals reviewed.      Assessment:     1. Strep pharyngitis    2. Sore throat    3. Tachycardia        Plan:       Strep pharyngitis  -     azithromycin (ZITHROMAX) 500 MG tablet; Take 1 tablet (500 mg total) by mouth once daily. for 5 days  Dispense: 5 tablet; Refill: 0    Sore throat  -     POCT Strep A, Molecular    Tachycardia      Results for orders placed or performed in visit on 09/30/23   POCT Strep A, Molecular   Result Value Ref Range    Molecular Strep A, POC Positive (A) Negative     Acceptable Yes      Alternate ibuprofen and tylenol as needed for fever/pain/body aches every 4-6 hours. Rest, increase PO fluids.   Discussed with patient the importance of f/u with their primary care provider. Urged to go to the ER for any worsening signs or symptoms.       Medical Decision Making:   Clinical Tests:   Lab Tests: Reviewed and Ordered       <> Summary of Lab: Strep positive

## 2024-10-11 NOTE — LETTER
September 30, 2023      [facility]  [address], [address]  [place] [address]  Phone: [phone]  Fax: [phone]       Patient: Eboni Wyatt   YOB: 2005  Date of Visit: 09/30/2023    To Whom It May Concern:    Lj Wyatt  was at [facility] on 09/30/2023. The patient may return to work/school on 10/02/2023 with no restrictions. If you have any questions or concerns, or if I can be of further assistance, please do not hesitate to contact me.    Sincerely,    Marianne Francis PA-C     
Communicate Risk of Fall with Harm to all staff/Reinforce activity limits and safety measures with patient and family/Tailored Fall Risk Interventions/Visual Cue: Yellow wristband and red socks/Bed in lowest position, wheels locked, appropriate side rails in place/Call bell, personal items and telephone in reach/Instruct patient to call for assistance before getting out of bed or chair/Non-slip footwear when patient is out of bed/[place] to call system/Physically safe environment - no spills, clutter or unnecessary equipment/Purposeful Proactive Rounding/Room/bathroom lighting operational, light cord in reach